# Patient Record
Sex: FEMALE | Race: WHITE | Employment: FULL TIME | URBAN - NONMETROPOLITAN AREA
[De-identification: names, ages, dates, MRNs, and addresses within clinical notes are randomized per-mention and may not be internally consistent; named-entity substitution may affect disease eponyms.]

---

## 2017-05-27 ENCOUNTER — HOSPITAL ENCOUNTER (EMERGENCY)
Age: 22
Discharge: HOME | End: 2017-05-27
Attending: EMERGENCY MEDICINE
Payer: COMMERCIAL

## 2017-05-27 ENCOUNTER — APPOINTMENT (OUTPATIENT)
Dept: GENERAL RADIOLOGY | Age: 22
End: 2017-05-27
Payer: COMMERCIAL

## 2017-05-27 VITALS
DIASTOLIC BLOOD PRESSURE: 75 MMHG | TEMPERATURE: 97.6 F | SYSTOLIC BLOOD PRESSURE: 113 MMHG | OXYGEN SATURATION: 99 % | HEART RATE: 78 BPM | RESPIRATION RATE: 16 BRPM

## 2017-05-27 DIAGNOSIS — S52.135A CLOSED NONDISPLACED FRACTURE OF NECK OF LEFT RADIUS, INITIAL ENCOUNTER: Primary | ICD-10-CM

## 2017-05-27 PROCEDURE — 99283 EMERGENCY DEPT VISIT LOW MDM: CPT

## 2017-05-27 PROCEDURE — 73080 X-RAY EXAM OF ELBOW: CPT

## 2017-05-27 PROCEDURE — 29105 APPLICATION LONG ARM SPLINT: CPT

## 2017-05-27 ASSESSMENT — PAIN DESCRIPTION - ORIENTATION: ORIENTATION: LEFT

## 2017-05-27 ASSESSMENT — PAIN DESCRIPTION - LOCATION: LOCATION: ELBOW

## 2017-05-27 ASSESSMENT — PAIN SCALES - GENERAL: PAINLEVEL_OUTOF10: 7

## 2018-02-27 ENCOUNTER — HOSPITAL ENCOUNTER (INPATIENT)
Age: 23
LOS: 5 days | Discharge: HOME | DRG: 720 | End: 2018-03-04
Attending: OBSTETRICS & GYNECOLOGY | Admitting: OBSTETRICS & GYNECOLOGY
Payer: COMMERCIAL

## 2018-02-27 ENCOUNTER — APPOINTMENT (OUTPATIENT)
Dept: GENERAL RADIOLOGY | Age: 23
DRG: 720 | End: 2018-02-27
Payer: COMMERCIAL

## 2018-02-27 ENCOUNTER — APPOINTMENT (OUTPATIENT)
Dept: ULTRASOUND IMAGING | Age: 23
DRG: 720 | End: 2018-02-27
Payer: COMMERCIAL

## 2018-02-27 DIAGNOSIS — O26.892 ABDOMINAL PAIN DURING PREGNANCY IN SECOND TRIMESTER: ICD-10-CM

## 2018-02-27 DIAGNOSIS — R68.89 FLU-LIKE SYMPTOMS: ICD-10-CM

## 2018-02-27 DIAGNOSIS — E87.6 HYPOKALEMIA: ICD-10-CM

## 2018-02-27 DIAGNOSIS — D64.9 ANEMIA, UNSPECIFIED TYPE: Primary | ICD-10-CM

## 2018-02-27 DIAGNOSIS — R10.9 ABDOMINAL PAIN DURING PREGNANCY IN SECOND TRIMESTER: ICD-10-CM

## 2018-02-27 LAB
-: ABNORMAL
ABSOLUTE EOS #: 0.27 K/UL (ref 0–0.44)
ABSOLUTE IMMATURE GRANULOCYTE: 0.54 K/UL (ref 0–0.3)
ABSOLUTE LYMPH #: 0.95 K/UL (ref 1.1–3.7)
ABSOLUTE MONO #: 0.68 K/UL (ref 0.1–1.2)
ABSOLUTE RETIC #: 0.05 M/UL (ref 0.03–0.08)
ALBUMIN SERPL-MCNC: 3.1 G/DL (ref 3.5–5.2)
ALBUMIN/GLOBULIN RATIO: 0.8 (ref 1–2.5)
ALLEN TEST: ABNORMAL
ALP BLD-CCNC: 77 U/L (ref 35–104)
ALT SERPL-CCNC: 13 U/L (ref 5–33)
AMORPHOUS: ABNORMAL
AMPHETAMINE SCREEN URINE: NEGATIVE
AMYLASE: 40 U/L (ref 28–100)
ANION GAP SERPL CALCULATED.3IONS-SCNC: 15 MMOL/L (ref 9–17)
AST SERPL-CCNC: 22 U/L
BACTERIA: ABNORMAL
BARBITURATE SCREEN URINE: NEGATIVE
BASOPHILS # BLD: 0 % (ref 0–2)
BASOPHILS ABSOLUTE: 0 K/UL (ref 0–0.2)
BENZODIAZEPINE SCREEN, URINE: NEGATIVE
BILIRUB SERPL-MCNC: 0.31 MG/DL (ref 0.3–1.2)
BILIRUBIN URINE: NEGATIVE
BUN BLDV-MCNC: 5 MG/DL (ref 6–20)
BUN/CREAT BLD: 10 (ref 9–20)
BUPRENORPHINE URINE: NEGATIVE
CALCIUM SERPL-MCNC: 8.6 MG/DL (ref 8.6–10.4)
CANNABINOID SCREEN URINE: POSITIVE
CARBOXYHEMOGLOBIN: ABNORMAL % (ref 0–5)
CASTS UA: ABNORMAL /LPF
CHLORIDE BLD-SCNC: 98 MMOL/L (ref 98–107)
CO2: 16 MMOL/L (ref 20–31)
COCAINE METABOLITE, URINE: NEGATIVE
COLOR: YELLOW
COMMENT UA: ABNORMAL
CREAT SERPL-MCNC: 0.48 MG/DL (ref 0.5–0.9)
CRYSTALS, UA: ABNORMAL /HPF
DIFFERENTIAL TYPE: ABNORMAL
DIRECT EXAM: NORMAL
EKG ATRIAL RATE: 115 BPM
EKG P AXIS: 60 DEGREES
EKG P-R INTERVAL: 150 MS
EKG Q-T INTERVAL: 316 MS
EKG QRS DURATION: 78 MS
EKG QTC CALCULATION (BAZETT): 437 MS
EKG R AXIS: 57 DEGREES
EKG T AXIS: 17 DEGREES
EKG VENTRICULAR RATE: 115 BPM
EOSINOPHILS RELATIVE PERCENT: 2 % (ref 1–4)
EPITHELIAL CELLS UA: ABNORMAL /HPF (ref 0–25)
FERRITIN: 20 UG/L (ref 13–150)
FIO2: ABNORMAL
FOLATE: 18.2 NG/ML
GFR AFRICAN AMERICAN: >60 ML/MIN
GFR NON-AFRICAN AMERICAN: >60 ML/MIN
GFR SERPL CREATININE-BSD FRML MDRD: ABNORMAL ML/MIN/{1.73_M2}
GFR SERPL CREATININE-BSD FRML MDRD: ABNORMAL ML/MIN/{1.73_M2}
GLUCOSE BLD-MCNC: 114 MG/DL (ref 70–99)
GLUCOSE URINE: NEGATIVE
HCO3 ARTERIAL: 13.1 MMOL/L (ref 22–26)
HCT VFR BLD CALC: 23.3 % (ref 36.3–47.1)
HEMOGLOBIN: 7 G/DL (ref 11.9–15.1)
IMMATURE GRANULOCYTES: 4 %
IMMATURE RETIC FRACT: 29.5 % (ref 2.7–18.3)
IRON SATURATION: 3 % (ref 20–55)
IRON: 13 UG/DL (ref 37–145)
KETONES, URINE: ABNORMAL
LACTIC ACID, SEPSIS WHOLE BLOOD: NORMAL MMOL/L (ref 0.5–1.9)
LACTIC ACID, SEPSIS: 0.5 MMOL/L (ref 0.5–1.9)
LACTIC ACID, WHOLE BLOOD: NORMAL MMOL/L (ref 0.7–2.1)
LACTIC ACID: 0.5 MMOL/L (ref 0.5–2.2)
LEUKOCYTE ESTERASE, URINE: ABNORMAL
LIPASE: 17 U/L (ref 13–60)
LYMPHOCYTES # BLD: 7 % (ref 24–43)
Lab: NORMAL
Lab: NORMAL
MCH RBC QN AUTO: 20.2 PG (ref 25.2–33.5)
MCHC RBC AUTO-ENTMCNC: 30 G/DL (ref 28.4–34.8)
MCV RBC AUTO: 67.1 FL (ref 82.6–102.9)
MDMA URINE: ABNORMAL
METHADONE SCREEN, URINE: NEGATIVE
METHAMPHETAMINE, URINE: NEGATIVE
METHEMOGLOBIN: ABNORMAL % (ref 0–1.9)
MODE: ABNORMAL
MONOCYTES # BLD: 5 % (ref 3–12)
MONONUCLEOSIS SCREEN: NEGATIVE
MORPHOLOGY: ABNORMAL
MUCUS: ABNORMAL
NEGATIVE BASE EXCESS, ART: 10.1 MMOL/L (ref 0–2)
NITRITE, URINE: NEGATIVE
NOTIFICATION TIME: ABNORMAL
NOTIFICATION: ABNORMAL
NRBC AUTOMATED: 0.5 PER 100 WBC
O2 DEVICE/FLOW/%: ABNORMAL
O2 SAT, ARTERIAL: 97.8 % (ref 95–98)
OPIATES, URINE: NEGATIVE
OTHER OBSERVATIONS UA: ABNORMAL
OXYCODONE SCREEN URINE: NEGATIVE
OXYHEMOGLOBIN: ABNORMAL % (ref 95–98)
PATIENT TEMP: 37
PCO2 ARTERIAL: 23.7 MMHG (ref 35–45)
PCO2, ART, TEMP ADJ: ABNORMAL
PDW BLD-RTO: 17.1 % (ref 11.8–14.4)
PEEP/CPAP: ABNORMAL
PH ARTERIAL: 7.36 (ref 7.35–7.45)
PH UA: 6 (ref 5–9)
PH, ART, TEMP ADJ: ABNORMAL (ref 7.35–7.45)
PHENCYCLIDINE, URINE: NEGATIVE
PLATELET # BLD: 217 K/UL (ref 138–453)
PLATELET ESTIMATE: ABNORMAL
PMV BLD AUTO: 9.7 FL (ref 8.1–13.5)
PO2 ARTERIAL: 106.3 MMHG (ref 80–100)
PO2, ART, TEMP ADJ: ABNORMAL MMHG (ref 80–100)
POSITIVE BASE EXCESS, ART: ABNORMAL MMOL/L (ref 0–2)
POTASSIUM SERPL-SCNC: 3.3 MMOL/L (ref 3.7–5.3)
PROPOXYPHENE, URINE: NEGATIVE
PROTEIN UA: NEGATIVE
PSV: ABNORMAL
PT. POSITION: ABNORMAL
RBC # BLD: 3.47 M/UL (ref 3.95–5.11)
RBC # BLD: ABNORMAL 10*6/UL
RBC UA: ABNORMAL /HPF (ref 0–2)
RENAL EPITHELIAL, UA: ABNORMAL /HPF
RESPIRATORY RATE: ABNORMAL
RETIC %: 1.5 % (ref 0.5–1.9)
RETIC HEMOGLOBIN: 23.5 PG (ref 28.2–35.7)
SAMPLE SITE: ABNORMAL
SEG NEUTROPHILS: 82 % (ref 36–65)
SEGMENTED NEUTROPHILS ABSOLUTE COUNT: 11.06 K/UL (ref 1.5–8.1)
SET RATE: ABNORMAL
SODIUM BLD-SCNC: 129 MMOL/L (ref 135–144)
SPECIFIC GRAVITY UA: 1.01 (ref 1.01–1.02)
SPECIMEN DESCRIPTION: NORMAL
SPECIMEN DESCRIPTION: NORMAL
STATUS: NORMAL
STATUS: NORMAL
TEST INFORMATION: ABNORMAL
TEXT FOR RESPIRATORY: ABNORMAL
THYROXINE, FREE: 1.04 NG/DL (ref 0.93–1.7)
TOTAL HB: ABNORMAL G/DL (ref 12–16)
TOTAL IRON BINDING CAPACITY: 455 UG/DL (ref 250–450)
TOTAL PROTEIN: 7.2 G/DL (ref 6.4–8.3)
TOTAL RATE: ABNORMAL
TRICHOMONAS: ABNORMAL
TRICYCLIC ANTIDEPRESSANTS, UR: NEGATIVE
TSH SERPL DL<=0.05 MIU/L-ACNC: 0.17 MIU/L (ref 0.3–5)
TURBIDITY: CLEAR
UNSATURATED IRON BINDING CAPACITY: 441.7 UG/DL (ref 112–347)
URINE HGB: NEGATIVE
UROBILINOGEN, URINE: NORMAL
VITAMIN B-12: 261 PG/ML (ref 211–946)
VT: ABNORMAL
WBC # BLD: 13.5 K/UL (ref 3.5–11.3)
WBC # BLD: ABNORMAL 10*3/UL
WBC UA: ABNORMAL /HPF (ref 0–5)
YEAST: ABNORMAL

## 2018-02-27 PROCEDURE — 6360000002 HC RX W HCPCS: Performed by: ADVANCED PRACTICE MIDWIFE

## 2018-02-27 PROCEDURE — 2580000003 HC RX 258: Performed by: INTERNAL MEDICINE

## 2018-02-27 PROCEDURE — 82607 VITAMIN B-12: CPT

## 2018-02-27 PROCEDURE — 81001 URINALYSIS AUTO W/SCOPE: CPT

## 2018-02-27 PROCEDURE — 6360000002 HC RX W HCPCS: Performed by: PHYSICIAN ASSISTANT

## 2018-02-27 PROCEDURE — 36430 TRANSFUSION BLD/BLD COMPNT: CPT

## 2018-02-27 PROCEDURE — 76805 OB US >/= 14 WKS SNGL FETUS: CPT

## 2018-02-27 PROCEDURE — 6370000000 HC RX 637 (ALT 250 FOR IP): Performed by: INTERNAL MEDICINE

## 2018-02-27 PROCEDURE — 84443 ASSAY THYROID STIM HORMONE: CPT

## 2018-02-27 PROCEDURE — 84145 PROCALCITONIN (PCT): CPT

## 2018-02-27 PROCEDURE — 82805 BLOOD GASES W/O2 SATURATION: CPT

## 2018-02-27 PROCEDURE — 96374 THER/PROPH/DIAG INJ IV PUSH: CPT

## 2018-02-27 PROCEDURE — 71046 X-RAY EXAM CHEST 2 VIEWS: CPT

## 2018-02-27 PROCEDURE — 87651 STREP A DNA AMP PROBE: CPT

## 2018-02-27 PROCEDURE — 87804 INFLUENZA ASSAY W/OPTIC: CPT

## 2018-02-27 PROCEDURE — 2000000000 HC ICU R&B

## 2018-02-27 PROCEDURE — 87040 BLOOD CULTURE FOR BACTERIA: CPT

## 2018-02-27 PROCEDURE — 76705 ECHO EXAM OF ABDOMEN: CPT

## 2018-02-27 PROCEDURE — 6360000002 HC RX W HCPCS: Performed by: INTERNAL MEDICINE

## 2018-02-27 PROCEDURE — 6370000000 HC RX 637 (ALT 250 FOR IP): Performed by: ADVANCED PRACTICE MIDWIFE

## 2018-02-27 PROCEDURE — 99285 EMERGENCY DEPT VISIT HI MDM: CPT

## 2018-02-27 PROCEDURE — 99221 1ST HOSP IP/OBS SF/LOW 40: CPT | Performed by: ADVANCED PRACTICE MIDWIFE

## 2018-02-27 PROCEDURE — 2580000003 HC RX 258: Performed by: PHYSICIAN ASSISTANT

## 2018-02-27 PROCEDURE — 86900 BLOOD TYPING SEROLOGIC ABO: CPT

## 2018-02-27 PROCEDURE — 85045 AUTOMATED RETICULOCYTE COUNT: CPT

## 2018-02-27 PROCEDURE — 80053 COMPREHEN METABOLIC PANEL: CPT

## 2018-02-27 PROCEDURE — 36415 COLL VENOUS BLD VENIPUNCTURE: CPT

## 2018-02-27 PROCEDURE — 6370000000 HC RX 637 (ALT 250 FOR IP): Performed by: PHYSICIAN ASSISTANT

## 2018-02-27 PROCEDURE — 86308 HETEROPHILE ANTIBODY SCREEN: CPT

## 2018-02-27 PROCEDURE — 84439 ASSAY OF FREE THYROXINE: CPT

## 2018-02-27 PROCEDURE — 82746 ASSAY OF FOLIC ACID SERUM: CPT

## 2018-02-27 PROCEDURE — 86901 BLOOD TYPING SEROLOGIC RH(D): CPT

## 2018-02-27 PROCEDURE — P9016 RBC LEUKOCYTES REDUCED: HCPCS

## 2018-02-27 PROCEDURE — 86920 COMPATIBILITY TEST SPIN: CPT

## 2018-02-27 PROCEDURE — 80306 DRUG TEST PRSMV INSTRMNT: CPT

## 2018-02-27 PROCEDURE — 83605 ASSAY OF LACTIC ACID: CPT

## 2018-02-27 PROCEDURE — 87086 URINE CULTURE/COLONY COUNT: CPT

## 2018-02-27 PROCEDURE — 83690 ASSAY OF LIPASE: CPT

## 2018-02-27 PROCEDURE — 85025 COMPLETE CBC W/AUTO DIFF WBC: CPT

## 2018-02-27 PROCEDURE — 86850 RBC ANTIBODY SCREEN: CPT

## 2018-02-27 PROCEDURE — 82150 ASSAY OF AMYLASE: CPT

## 2018-02-27 PROCEDURE — 36600 WITHDRAWAL OF ARTERIAL BLOOD: CPT

## 2018-02-27 PROCEDURE — 83550 IRON BINDING TEST: CPT

## 2018-02-27 PROCEDURE — 83540 ASSAY OF IRON: CPT

## 2018-02-27 PROCEDURE — 93005 ELECTROCARDIOGRAM TRACING: CPT

## 2018-02-27 PROCEDURE — 82728 ASSAY OF FERRITIN: CPT

## 2018-02-27 RX ORDER — 0.9 % SODIUM CHLORIDE 0.9 %
1000 INTRAVENOUS SOLUTION INTRAVENOUS ONCE
Status: COMPLETED | OUTPATIENT
Start: 2018-02-27 | End: 2018-02-27

## 2018-02-27 RX ORDER — ONDANSETRON 2 MG/ML
4 INJECTION INTRAMUSCULAR; INTRAVENOUS EVERY 6 HOURS PRN
Status: DISCONTINUED | OUTPATIENT
Start: 2018-02-27 | End: 2018-02-28

## 2018-02-27 RX ORDER — SODIUM CHLORIDE 9 MG/ML
INJECTION, SOLUTION INTRAVENOUS CONTINUOUS
Status: DISCONTINUED | OUTPATIENT
Start: 2018-02-27 | End: 2018-02-28

## 2018-02-27 RX ORDER — OSELTAMIVIR PHOSPHATE 75 MG/1
75 CAPSULE ORAL ONCE
Status: COMPLETED | OUTPATIENT
Start: 2018-02-27 | End: 2018-02-27

## 2018-02-27 RX ORDER — ACETAMINOPHEN 325 MG/1
650 TABLET ORAL EVERY 4 HOURS PRN
Status: DISCONTINUED | OUTPATIENT
Start: 2018-02-27 | End: 2018-02-28

## 2018-02-27 RX ORDER — ONDANSETRON 2 MG/ML
4 INJECTION INTRAMUSCULAR; INTRAVENOUS ONCE
Status: COMPLETED | OUTPATIENT
Start: 2018-02-27 | End: 2018-02-27

## 2018-02-27 RX ORDER — POTASSIUM CHLORIDE 20 MEQ/1
40 TABLET, EXTENDED RELEASE ORAL ONCE
Status: COMPLETED | OUTPATIENT
Start: 2018-02-27 | End: 2018-02-27

## 2018-02-27 RX ORDER — ACETAMINOPHEN 500 MG
1000 TABLET ORAL ONCE
Status: COMPLETED | OUTPATIENT
Start: 2018-02-27 | End: 2018-02-27

## 2018-02-27 RX ORDER — SODIUM CHLORIDE 0.9 % (FLUSH) 0.9 %
10 SYRINGE (ML) INJECTION PRN
Status: DISCONTINUED | OUTPATIENT
Start: 2018-02-27 | End: 2018-02-28

## 2018-02-27 RX ORDER — SODIUM CHLORIDE 0.9 % (FLUSH) 0.9 %
10 SYRINGE (ML) INJECTION EVERY 12 HOURS SCHEDULED
Status: DISCONTINUED | OUTPATIENT
Start: 2018-02-27 | End: 2018-02-28

## 2018-02-27 RX ORDER — OSELTAMIVIR PHOSPHATE 75 MG/1
75 CAPSULE ORAL 2 TIMES DAILY
Status: DISCONTINUED | OUTPATIENT
Start: 2018-02-28 | End: 2018-02-28

## 2018-02-27 RX ORDER — ACETAMINOPHEN 500 MG
1000 TABLET ORAL EVERY 6 HOURS PRN
Status: DISCONTINUED | OUTPATIENT
Start: 2018-02-27 | End: 2018-02-28

## 2018-02-27 RX ORDER — 0.9 % SODIUM CHLORIDE 0.9 %
250 INTRAVENOUS SOLUTION INTRAVENOUS ONCE
Status: DISCONTINUED | OUTPATIENT
Start: 2018-02-27 | End: 2018-03-04 | Stop reason: HOSPADM

## 2018-02-27 RX ORDER — DIPHENHYDRAMINE HCL 25 MG
25 CAPSULE ORAL EVERY 6 HOURS PRN
Status: DISCONTINUED | OUTPATIENT
Start: 2018-02-27 | End: 2018-02-28

## 2018-02-27 RX ADMIN — PIPERACILLIN SODIUM AND TAZOBACTAM SODIUM 3.38 G: 3; .375 INJECTION, POWDER, LYOPHILIZED, FOR SOLUTION INTRAVENOUS at 21:12

## 2018-02-27 RX ADMIN — OSELTAMIVIR PHOSPHATE 75 MG: 75 CAPSULE ORAL at 15:48

## 2018-02-27 RX ADMIN — ONDANSETRON 4 MG: 2 INJECTION INTRAMUSCULAR; INTRAVENOUS at 12:44

## 2018-02-27 RX ADMIN — DIPHENHYDRAMINE HYDROCHLORIDE 25 MG: 25 CAPSULE ORAL at 21:23

## 2018-02-27 RX ADMIN — SODIUM CHLORIDE: 9 INJECTION, SOLUTION INTRAVENOUS at 19:20

## 2018-02-27 RX ADMIN — ONDANSETRON 4 MG: 2 INJECTION INTRAMUSCULAR; INTRAVENOUS at 21:22

## 2018-02-27 RX ADMIN — ACETAMINOPHEN 1000 MG: 500 TABLET ORAL at 12:44

## 2018-02-27 RX ADMIN — POTASSIUM CHLORIDE 40 MEQ: 1500 TABLET, EXTENDED RELEASE ORAL at 15:44

## 2018-02-27 RX ADMIN — SODIUM CHLORIDE 1000 ML: 9 INJECTION, SOLUTION INTRAVENOUS at 12:51

## 2018-02-27 RX ADMIN — ACETAMINOPHEN 1000 MG: 500 TABLET, COATED ORAL at 19:24

## 2018-02-27 RX ADMIN — SODIUM CHLORIDE 1000 ML: 9 INJECTION, SOLUTION INTRAVENOUS at 15:44

## 2018-02-27 RX ADMIN — ACETAMINOPHEN 650 MG: 325 TABLET ORAL at 23:46

## 2018-02-27 ASSESSMENT — ENCOUNTER SYMPTOMS
ABDOMINAL PAIN: 1
EYE REDNESS: 0
EYE DISCHARGE: 0
DIARRHEA: 0
CHEST TIGHTNESS: 0
BACK PAIN: 0
BLOOD IN STOOL: 0
SORE THROAT: 1
NAUSEA: 0
RHINORRHEA: 0
WHEEZING: 0
SHORTNESS OF BREATH: 0
VOMITING: 1
CONSTIPATION: 0
COUGH: 1

## 2018-02-27 ASSESSMENT — PAIN SCALES - GENERAL
PAINLEVEL_OUTOF10: 7
PAINLEVEL_OUTOF10: 8
PAINLEVEL_OUTOF10: 9
PAINLEVEL_OUTOF10: 9
PAINLEVEL_OUTOF10: 2
PAINLEVEL_OUTOF10: 7
PAINLEVEL_OUTOF10: 7

## 2018-02-27 ASSESSMENT — PAIN DESCRIPTION - PAIN TYPE
TYPE: ACUTE PAIN

## 2018-02-27 ASSESSMENT — PAIN DESCRIPTION - LOCATION
LOCATION: GENERALIZED
LOCATION: GENERALIZED
LOCATION_2: HEAD
LOCATION: ABDOMEN

## 2018-02-27 ASSESSMENT — PAIN DESCRIPTION - DESCRIPTORS
DESCRIPTORS: ACHING
DESCRIPTORS: SHARP
DESCRIPTORS: ACHING
DESCRIPTORS: ACHING
DESCRIPTORS_2: ACHING

## 2018-02-27 ASSESSMENT — PAIN DESCRIPTION - INTENSITY: RATING_2: 7

## 2018-02-27 NOTE — LETTER
[facility] AT THE [facility] MED SURG  [address] [facility] [address]  Phone: [phone]             March 4, 2018    Patient: Kam Martin   YOB: 1995   Date of Visit: 2/27/2018       To Whom It May Concern:    Kam Martin was seen and treated in our facility  beginning 2/27/2018 until 3/4/2018. She may return to work on 3/5/2018.       Sincerely,       Alberto Madison RN         Signature:__________________________________

## 2018-02-27 NOTE — H&P
Value Date    COLORU YELLOW 02/27/2018    PROTEINU NEGATIVE 02/27/2018    PHUR 6.0 02/27/2018    WBCUA 2 TO 5 02/27/2018    RBCUA 0 TO 2 02/27/2018    MUCUS TRACE 02/27/2018    TRICHOMONAS NOT REPORTED 02/27/2018    YEAST NOT REPORTED 02/27/2018    BACTERIA 1+ 02/27/2018    SPECGRAV 1.015 02/27/2018    LEUKOCYTESUR SMALL 02/27/2018    UROBILINOGEN Normal 02/27/2018    BILIRUBINUR NEGATIVE 02/27/2018    GLUCOSEU NEGATIVE 02/27/2018    AMORPHOUS TRACE 02/27/2018       ASSESSMENT AND PLAN: 25year old, 32 week gestation female, with current febrile respiratory illness and severe anemia. Obstetrically stable. Estimated length of stay: 24-48 hours    Patient Active Hospital Problem List:   Anemia (2/27/2018)    Assessment: severe anemia    Plan: transfuse one unit and repeat cbc and observe for response          Fetus: Reassuring by sono    Other: with consult of Dr. Franklin Amos will transfuse one unit PRBCs this evening and re-evaluate, treat with tamiflu and antipyretics. Attempt to get minimally delivery records from [facility], and if possible further investigation into previous prenatal records from Dr. Mo Barnett office. Lenin Connolly.  ISA Colunga,2/27/2018 5:53 PM

## 2018-02-27 NOTE — ED PROVIDER NOTES
Shanna Saravia, nurse midwife on call. She is aware patient's presentation and current workup and results specifically fever and significant anemia. I told her she has been typed and screened. She is questionable if she wants blood. Does agree to admit her and will consult medicine. Procedures    FINAL IMPRESSION      1. Anemia, unspecified type    2. Abdominal pain during pregnancy in second trimester    3. Hypokalemia    4. Flu-like symptoms          DISPOSITION/PLAN   DISPOSITION        PATIENT REFERRED TO:  No follow-up provider specified. DISCHARGE MEDICATIONS:  New Prescriptions    No medications on file              Summation      Patient Course:      ED Medications administered this visit:    Medications   0.9 % sodium chloride bolus (1,000 mLs Intravenous New Bag 2/27/18 1544)   oseltamivir (TAMIFLU) capsule 75 mg (not administered)   0.9 % sodium chloride bolus (0 mLs Intravenous Stopped 2/27/18 1408)   ondansetron (ZOFRAN) injection 4 mg (4 mg Intravenous Given 2/27/18 1244)   acetaminophen (TYLENOL) tablet 1,000 mg (1,000 mg Oral Given 2/27/18 1244)   potassium chloride (KLOR-CON M) extended release tablet 40 mEq (40 mEq Oral Given 2/27/18 1544)       New Prescriptions from this visit:    New Prescriptions    No medications on file       Follow-up:  No follow-up provider specified. Final Impression:   1. Anemia, unspecified type    2. Abdominal pain during pregnancy in second trimester    3. Hypokalemia    4.  Flu-like symptoms               (Please note that portions of this note were completed with a voice recognition program.  Efforts were made to edit the dictations but occasionally words are mis-transcribed.)            Jalil Dodge PA-C  02/27/18 8440

## 2018-02-28 ENCOUNTER — ANESTHESIA EVENT (OUTPATIENT)
Dept: OPERATING ROOM | Age: 23
DRG: 720 | End: 2018-02-28
Payer: COMMERCIAL

## 2018-02-28 ENCOUNTER — ANESTHESIA (OUTPATIENT)
Dept: OPERATING ROOM | Age: 23
DRG: 720 | End: 2018-02-28
Payer: COMMERCIAL

## 2018-02-28 ENCOUNTER — TELEPHONE (OUTPATIENT)
Dept: OBGYN | Age: 23
End: 2018-02-28

## 2018-02-28 ENCOUNTER — APPOINTMENT (OUTPATIENT)
Dept: GENERAL RADIOLOGY | Age: 23
DRG: 720 | End: 2018-02-28
Payer: COMMERCIAL

## 2018-02-28 VITALS
DIASTOLIC BLOOD PRESSURE: 58 MMHG | OXYGEN SATURATION: 96 % | TEMPERATURE: 99.5 F | SYSTOLIC BLOOD PRESSURE: 115 MMHG | RESPIRATION RATE: 8 BRPM

## 2018-02-28 PROBLEM — N13.30 HYDRONEPHROSIS OF RIGHT KIDNEY: Status: ACTIVE | Noted: 2018-02-28

## 2018-02-28 PROBLEM — E87.1 HYPONATREMIA: Status: ACTIVE | Noted: 2018-02-28

## 2018-02-28 PROBLEM — E87.6 HYPOKALEMIA: Status: ACTIVE | Noted: 2018-02-28

## 2018-02-28 PROBLEM — A41.9 SEVERE SEPSIS (HCC): Status: ACTIVE | Noted: 2018-02-28

## 2018-02-28 PROBLEM — F12.10 CANNABIS ABUSE: Status: ACTIVE | Noted: 2018-02-28

## 2018-02-28 PROBLEM — R65.20 SEVERE SEPSIS (HCC): Status: ACTIVE | Noted: 2018-02-28

## 2018-02-28 LAB
ABSOLUTE EOS #: 0 K/UL (ref 0–0.44)
ABSOLUTE IMMATURE GRANULOCYTE: 0.09 K/UL (ref 0–0.3)
ABSOLUTE LYMPH #: 0.53 K/UL (ref 1.1–3.7)
ABSOLUTE MONO #: 0.26 K/UL (ref 0.1–1.2)
ALBUMIN SERPL-MCNC: 2.5 G/DL (ref 3.5–5.2)
ALBUMIN/GLOBULIN RATIO: 0.8 (ref 1–2.5)
ALP BLD-CCNC: 68 U/L (ref 35–104)
ALT SERPL-CCNC: 10 U/L (ref 5–33)
ANION GAP SERPL CALCULATED.3IONS-SCNC: 16 MMOL/L (ref 9–17)
AST SERPL-CCNC: 18 U/L
BASOPHILS # BLD: 0 % (ref 0–2)
BASOPHILS ABSOLUTE: 0 K/UL (ref 0–0.2)
BILIRUB SERPL-MCNC: 0.25 MG/DL (ref 0.3–1.2)
BUN BLDV-MCNC: 3 MG/DL (ref 6–20)
BUN/CREAT BLD: 10 (ref 9–20)
CALCIUM SERPL-MCNC: 7.4 MG/DL (ref 8.6–10.4)
CHLORIDE BLD-SCNC: 105 MMOL/L (ref 98–107)
CO2: 13 MMOL/L (ref 20–31)
CREAT SERPL-MCNC: 0.29 MG/DL (ref 0.5–0.9)
CULTURE: NORMAL
CULTURE: NORMAL
DIFFERENTIAL TYPE: ABNORMAL
DIRECT EXAM: NORMAL
DIRECT EXAM: NORMAL
EOSINOPHILS RELATIVE PERCENT: 0 % (ref 1–4)
FIBRINOGEN: 362 MG/DL (ref 185–451)
GFR AFRICAN AMERICAN: >60 ML/MIN
GFR NON-AFRICAN AMERICAN: >60 ML/MIN
GFR SERPL CREATININE-BSD FRML MDRD: ABNORMAL ML/MIN/{1.73_M2}
GFR SERPL CREATININE-BSD FRML MDRD: ABNORMAL ML/MIN/{1.73_M2}
GLUCOSE BLD-MCNC: 79 MG/DL (ref 70–99)
HCT VFR BLD CALC: 21.5 % (ref 36.3–47.1)
HCT VFR BLD CALC: 25.7 % (ref 36.3–47.1)
HEMOGLOBIN: 6.4 G/DL (ref 11.9–15.1)
HEMOGLOBIN: 7.7 G/DL (ref 11.9–15.1)
HEPATITIS B SURFACE ANTIGEN: NONREACTIVE
HEPATITIS C ANTIBODY: NONREACTIVE
HIV AG/AB: NONREACTIVE
IMMATURE GRANULOCYTES: 1 %
INR BLD: 1 (ref 0.9–1.2)
LACTIC ACID, WHOLE BLOOD: NORMAL MMOL/L (ref 0.7–2.1)
LACTIC ACID: 0.5 MMOL/L (ref 0.5–2.2)
LYMPHOCYTES # BLD: 6 % (ref 24–43)
Lab: NORMAL
Lab: NORMAL
MCH RBC QN AUTO: 21.1 PG (ref 25.2–33.5)
MCH RBC QN AUTO: 21.6 PG (ref 25.2–33.5)
MCHC RBC AUTO-ENTMCNC: 29.8 G/DL (ref 28.4–34.8)
MCHC RBC AUTO-ENTMCNC: 30 G/DL (ref 28.4–34.8)
MCV RBC AUTO: 70.7 FL (ref 82.6–102.9)
MCV RBC AUTO: 72 FL (ref 82.6–102.9)
MONOCYTES # BLD: 3 % (ref 3–12)
MORPHOLOGY: ABNORMAL
NRBC AUTOMATED: 0.2 PER 100 WBC
NRBC AUTOMATED: 0.3 PER 100 WBC
NUCLEATED RED BLOOD CELLS: 1 PER 100 WBC (ref 0–5)
PARTIAL THROMBOPLASTIN TIME: 33.1 SEC (ref 23.2–34.4)
PDW BLD-RTO: 18.7 % (ref 11.8–14.4)
PDW BLD-RTO: 21.2 % (ref 11.8–14.4)
PLATELET # BLD: 161 K/UL (ref 138–453)
PLATELET # BLD: ABNORMAL K/UL (ref 138–453)
PLATELET ESTIMATE: ABNORMAL
PLATELET, FLUORESCENCE: 127 K/UL (ref 138–453)
PLATELET, IMMATURE FRACTION: 2 % (ref 1.1–10.3)
PMV BLD AUTO: 9.8 FL (ref 8.1–13.5)
PMV BLD AUTO: ABNORMAL FL (ref 8.1–13.5)
POTASSIUM SERPL-SCNC: 3.6 MMOL/L (ref 3.7–5.3)
PROCALCITONIN: 0.14 NG/ML
PROTHROMBIN TIME: 10.7 SEC (ref 9.7–12.2)
RBC # BLD: 3.04 M/UL (ref 3.95–5.11)
RBC # BLD: 3.57 M/UL (ref 3.95–5.11)
RBC # BLD: ABNORMAL 10*6/UL
RUBV IGG SER QL: 3 IU/ML
SEG NEUTROPHILS: 90 % (ref 36–65)
SEGMENTED NEUTROPHILS ABSOLUTE COUNT: 7.93 K/UL (ref 1.5–8.1)
SODIUM BLD-SCNC: 134 MMOL/L (ref 135–144)
SPECIMEN DESCRIPTION: NORMAL
STATUS: NORMAL
STATUS: NORMAL
T. PALLIDUM, IGG: NONREACTIVE
TOTAL PROTEIN: 5.8 G/DL (ref 6.4–8.3)
WBC # BLD: 8.8 K/UL (ref 3.5–11.3)
WBC # BLD: 9.1 K/UL (ref 3.5–11.3)
WBC # BLD: ABNORMAL 10*3/UL

## 2018-02-28 PROCEDURE — 3700000001 HC ADD 15 MINUTES (ANESTHESIA): Performed by: UROLOGY

## 2018-02-28 PROCEDURE — 85730 THROMBOPLASTIN TIME PARTIAL: CPT

## 2018-02-28 PROCEDURE — 6370000000 HC RX 637 (ALT 250 FOR IP): Performed by: INTERNAL MEDICINE

## 2018-02-28 PROCEDURE — 83605 ASSAY OF LACTIC ACID: CPT

## 2018-02-28 PROCEDURE — 3600000014 HC SURGERY LEVEL 4 ADDTL 15MIN: Performed by: UROLOGY

## 2018-02-28 PROCEDURE — 6370000000 HC RX 637 (ALT 250 FOR IP): Performed by: UROLOGY

## 2018-02-28 PROCEDURE — 3600000004 HC SURGERY LEVEL 4 BASE: Performed by: UROLOGY

## 2018-02-28 PROCEDURE — 85025 COMPLETE CBC W/AUTO DIFF WBC: CPT

## 2018-02-28 PROCEDURE — 86762 RUBELLA ANTIBODY: CPT

## 2018-02-28 PROCEDURE — 87340 HEPATITIS B SURFACE AG IA: CPT

## 2018-02-28 PROCEDURE — 6370000000 HC RX 637 (ALT 250 FOR IP): Performed by: PHYSICIAN ASSISTANT

## 2018-02-28 PROCEDURE — 85027 COMPLETE CBC AUTOMATED: CPT

## 2018-02-28 PROCEDURE — 6360000002 HC RX W HCPCS: Performed by: NURSE ANESTHETIST, CERTIFIED REGISTERED

## 2018-02-28 PROCEDURE — 6360000002 HC RX W HCPCS: Performed by: INTERNAL MEDICINE

## 2018-02-28 PROCEDURE — 7100000000 HC PACU RECOVERY - FIRST 15 MIN: Performed by: UROLOGY

## 2018-02-28 PROCEDURE — 2580000003 HC RX 258: Performed by: INTERNAL MEDICINE

## 2018-02-28 PROCEDURE — 36415 COLL VENOUS BLD VENIPUNCTURE: CPT

## 2018-02-28 PROCEDURE — 6360000002 HC RX W HCPCS: Performed by: ADVANCED PRACTICE MIDWIFE

## 2018-02-28 PROCEDURE — P9016 RBC LEUKOCYTES REDUCED: HCPCS

## 2018-02-28 PROCEDURE — 0T768DZ DILATION OF RIGHT URETER WITH INTRALUMINAL DEVICE, VIA NATURAL OR ARTIFICIAL OPENING ENDOSCOPIC: ICD-10-PCS | Performed by: UROLOGY

## 2018-02-28 PROCEDURE — C2617 STENT, NON-COR, TEM W/O DEL: HCPCS | Performed by: UROLOGY

## 2018-02-28 PROCEDURE — 6360000002 HC RX W HCPCS: Performed by: UROLOGY

## 2018-02-28 PROCEDURE — 74018 RADEX ABDOMEN 1 VIEW: CPT

## 2018-02-28 PROCEDURE — 2580000003 HC RX 258: Performed by: ADVANCED PRACTICE MIDWIFE

## 2018-02-28 PROCEDURE — 85384 FIBRINOGEN ACTIVITY: CPT

## 2018-02-28 PROCEDURE — 85610 PROTHROMBIN TIME: CPT

## 2018-02-28 PROCEDURE — 2000000000 HC ICU R&B

## 2018-02-28 PROCEDURE — 80053 COMPREHEN METABOLIC PANEL: CPT

## 2018-02-28 PROCEDURE — 2500000003 HC RX 250 WO HCPCS: Performed by: UROLOGY

## 2018-02-28 PROCEDURE — 36430 TRANSFUSION BLD/BLD COMPNT: CPT

## 2018-02-28 PROCEDURE — 2500000003 HC RX 250 WO HCPCS: Performed by: NURSE ANESTHETIST, CERTIFIED REGISTERED

## 2018-02-28 PROCEDURE — 85055 RETICULATED PLATELET ASSAY: CPT

## 2018-02-28 PROCEDURE — S0028 INJECTION, FAMOTIDINE, 20 MG: HCPCS | Performed by: UROLOGY

## 2018-02-28 PROCEDURE — 2580000003 HC RX 258: Performed by: PHYSICIAN ASSISTANT

## 2018-02-28 PROCEDURE — 7100000001 HC PACU RECOVERY - ADDTL 15 MIN: Performed by: UROLOGY

## 2018-02-28 PROCEDURE — 3700000000 HC ANESTHESIA ATTENDED CARE: Performed by: UROLOGY

## 2018-02-28 PROCEDURE — 86780 TREPONEMA PALLIDUM: CPT

## 2018-02-28 PROCEDURE — 87389 HIV-1 AG W/HIV-1&-2 AB AG IA: CPT

## 2018-02-28 PROCEDURE — 2580000003 HC RX 258: Performed by: UROLOGY

## 2018-02-28 PROCEDURE — 86803 HEPATITIS C AB TEST: CPT

## 2018-02-28 DEVICE — URETERAL STENT
Type: IMPLANTABLE DEVICE | Site: URETER | Status: FUNCTIONAL
Brand: PERCUFLEX™ PLUS

## 2018-02-28 RX ORDER — ONDANSETRON 2 MG/ML
INJECTION INTRAMUSCULAR; INTRAVENOUS PRN
Status: DISCONTINUED | OUTPATIENT
Start: 2018-02-28 | End: 2018-02-28 | Stop reason: SDUPTHER

## 2018-02-28 RX ORDER — SODIUM CHLORIDE 0.9 % (FLUSH) 0.9 %
10 SYRINGE (ML) INJECTION EVERY 12 HOURS SCHEDULED
Status: DISCONTINUED | OUTPATIENT
Start: 2018-02-28 | End: 2018-03-04 | Stop reason: HOSPADM

## 2018-02-28 RX ORDER — ONDANSETRON 2 MG/ML
4 INJECTION INTRAMUSCULAR; INTRAVENOUS EVERY 6 HOURS PRN
Status: DISCONTINUED | OUTPATIENT
Start: 2018-02-28 | End: 2018-03-04 | Stop reason: HOSPADM

## 2018-02-28 RX ORDER — FENTANYL CITRATE 50 UG/ML
25 INJECTION, SOLUTION INTRAMUSCULAR; INTRAVENOUS EVERY 5 MIN PRN
Status: DISCONTINUED | OUTPATIENT
Start: 2018-02-28 | End: 2018-02-28 | Stop reason: HOSPADM

## 2018-02-28 RX ORDER — FENTANYL CITRATE 50 UG/ML
INJECTION, SOLUTION INTRAMUSCULAR; INTRAVENOUS PRN
Status: DISCONTINUED | OUTPATIENT
Start: 2018-02-28 | End: 2018-02-28 | Stop reason: SDUPTHER

## 2018-02-28 RX ORDER — SODIUM CHLORIDE 0.9 % (FLUSH) 0.9 %
10 SYRINGE (ML) INJECTION PRN
Status: DISCONTINUED | OUTPATIENT
Start: 2018-02-28 | End: 2018-03-04 | Stop reason: HOSPADM

## 2018-02-28 RX ORDER — DIPHENHYDRAMINE HCL 25 MG
25 CAPSULE ORAL EVERY 6 HOURS PRN
Status: DISCONTINUED | OUTPATIENT
Start: 2018-02-28 | End: 2018-03-04 | Stop reason: HOSPADM

## 2018-02-28 RX ORDER — SODIUM CHLORIDE 9 MG/ML
INJECTION, SOLUTION INTRAVENOUS CONTINUOUS
Status: DISCONTINUED | OUTPATIENT
Start: 2018-02-28 | End: 2018-03-02

## 2018-02-28 RX ORDER — ONDANSETRON 2 MG/ML
4 INJECTION INTRAMUSCULAR; INTRAVENOUS
Status: DISCONTINUED | OUTPATIENT
Start: 2018-02-28 | End: 2018-02-28 | Stop reason: HOSPADM

## 2018-02-28 RX ORDER — SODIUM CHLORIDE 9 MG/ML
INJECTION, SOLUTION INTRAVENOUS CONTINUOUS
Status: DISCONTINUED | OUTPATIENT
Start: 2018-02-28 | End: 2018-02-28

## 2018-02-28 RX ORDER — SUCCINYLCHOLINE CHLORIDE 20 MG/ML
INJECTION INTRAMUSCULAR; INTRAVENOUS PRN
Status: DISCONTINUED | OUTPATIENT
Start: 2018-02-28 | End: 2018-02-28 | Stop reason: SDUPTHER

## 2018-02-28 RX ORDER — MORPHINE SULFATE 2 MG/ML
2 INJECTION, SOLUTION INTRAMUSCULAR; INTRAVENOUS
Status: DISCONTINUED | OUTPATIENT
Start: 2018-02-28 | End: 2018-03-01

## 2018-02-28 RX ORDER — 0.9 % SODIUM CHLORIDE 0.9 %
250 INTRAVENOUS SOLUTION INTRAVENOUS ONCE
Status: COMPLETED | OUTPATIENT
Start: 2018-02-28 | End: 2018-02-28

## 2018-02-28 RX ORDER — ACETAMINOPHEN 500 MG
1000 TABLET ORAL EVERY 6 HOURS PRN
Status: DISCONTINUED | OUTPATIENT
Start: 2018-02-28 | End: 2018-03-04 | Stop reason: HOSPADM

## 2018-02-28 RX ORDER — PROPOFOL 10 MG/ML
INJECTION, EMULSION INTRAVENOUS PRN
Status: DISCONTINUED | OUTPATIENT
Start: 2018-02-28 | End: 2018-02-28 | Stop reason: SDUPTHER

## 2018-02-28 RX ORDER — LIDOCAINE HYDROCHLORIDE 20 MG/ML
INJECTION, SOLUTION INFILTRATION; PERINEURAL PRN
Status: DISCONTINUED | OUTPATIENT
Start: 2018-02-28 | End: 2018-02-28 | Stop reason: SDUPTHER

## 2018-02-28 RX ORDER — OSELTAMIVIR PHOSPHATE 75 MG/1
75 CAPSULE ORAL 2 TIMES DAILY
Status: DISCONTINUED | OUTPATIENT
Start: 2018-02-28 | End: 2018-02-28

## 2018-02-28 RX ORDER — ACETAMINOPHEN 325 MG/1
650 TABLET ORAL EVERY 4 HOURS PRN
Status: DISCONTINUED | OUTPATIENT
Start: 2018-02-28 | End: 2018-03-01

## 2018-02-28 RX ADMIN — SODIUM CHLORIDE: 9 INJECTION, SOLUTION INTRAVENOUS at 03:57

## 2018-02-28 RX ADMIN — ACETAMINOPHEN 650 MG: 325 TABLET ORAL at 12:09

## 2018-02-28 RX ADMIN — SODIUM CHLORIDE 250 ML: 9 INJECTION, SOLUTION INTRAVENOUS at 06:42

## 2018-02-28 RX ADMIN — SUCCINYLCHOLINE CHLORIDE 100 MG: 20 INJECTION, SOLUTION INTRAMUSCULAR; INTRAVENOUS at 09:21

## 2018-02-28 RX ADMIN — SODIUM CHLORIDE: 9 INJECTION, SOLUTION INTRAVENOUS at 12:19

## 2018-02-28 RX ADMIN — FENTANYL CITRATE 50 MCG: 50 INJECTION INTRAMUSCULAR; INTRAVENOUS at 09:21

## 2018-02-28 RX ADMIN — LIDOCAINE HYDROCHLORIDE 60 MG: 20 INJECTION, SOLUTION INFILTRATION; PERINEURAL at 09:21

## 2018-02-28 RX ADMIN — ONDANSETRON 4 MG: 2 INJECTION INTRAMUSCULAR; INTRAVENOUS at 09:28

## 2018-02-28 RX ADMIN — PROPOFOL 150 MG: 10 INJECTION, EMULSION INTRAVENOUS at 09:21

## 2018-02-28 RX ADMIN — ACETAMINOPHEN 650 MG: 325 TABLET ORAL at 06:28

## 2018-02-28 RX ADMIN — ACETAMINOPHEN 1000 MG: 500 TABLET, COATED ORAL at 23:11

## 2018-02-28 RX ADMIN — PIPERACILLIN SODIUM AND TAZOBACTAM SODIUM 3.38 G: 3; .375 INJECTION, POWDER, LYOPHILIZED, FOR SOLUTION INTRAVENOUS at 03:57

## 2018-02-28 RX ADMIN — SODIUM CHLORIDE: 9 INJECTION, SOLUTION INTRAVENOUS at 19:15

## 2018-02-28 RX ADMIN — FENTANYL CITRATE 25 MCG: 50 INJECTION INTRAMUSCULAR; INTRAVENOUS at 09:35

## 2018-02-28 RX ADMIN — Medication 10 ML: at 20:05

## 2018-02-28 RX ADMIN — MORPHINE SULFATE 2 MG: 2 INJECTION, SOLUTION INTRAMUSCULAR; INTRAVENOUS at 12:21

## 2018-02-28 RX ADMIN — BENZOCAINE AND MENTHOL 1 LOZENGE: 15; 3.6 LOZENGE ORAL at 23:10

## 2018-02-28 RX ADMIN — FENTANYL CITRATE 25 MCG: 50 INJECTION INTRAMUSCULAR; INTRAVENOUS at 09:29

## 2018-02-28 RX ADMIN — ONDANSETRON 4 MG: 2 INJECTION INTRAMUSCULAR; INTRAVENOUS at 03:57

## 2018-02-28 RX ADMIN — DEXTROSE MONOHYDRATE 3.38 G: 50 INJECTION, SOLUTION INTRAVENOUS at 13:15

## 2018-02-28 RX ADMIN — BENZOCAINE AND MENTHOL 1 LOZENGE: 15; 3.6 LOZENGE ORAL at 15:33

## 2018-02-28 RX ADMIN — FAMOTIDINE 20 MG: 10 INJECTION, SOLUTION INTRAVENOUS at 20:05

## 2018-02-28 RX ADMIN — MORPHINE SULFATE 2 MG: 2 INJECTION, SOLUTION INTRAMUSCULAR; INTRAVENOUS at 15:33

## 2018-02-28 RX ADMIN — PHENYLEPHRINE HYDROCHLORIDE 50 MCG: 10 INJECTION INTRAVENOUS at 09:40

## 2018-02-28 RX ADMIN — ACETAMINOPHEN 650 MG: 325 TABLET ORAL at 16:47

## 2018-02-28 RX ADMIN — SODIUM CHLORIDE, PRESERVATIVE FREE 10 ML: 5 INJECTION INTRAVENOUS at 08:14

## 2018-02-28 RX ADMIN — MORPHINE SULFATE 2 MG: 2 INJECTION, SOLUTION INTRAMUSCULAR; INTRAVENOUS at 22:20

## 2018-02-28 RX ADMIN — DEXTROSE MONOHYDRATE 3.38 G: 50 INJECTION, SOLUTION INTRAVENOUS at 19:15

## 2018-02-28 RX ADMIN — PHENYLEPHRINE HYDROCHLORIDE 50 MCG: 10 INJECTION INTRAVENOUS at 09:38

## 2018-02-28 RX ADMIN — PROPOFOL 40 MG: 10 INJECTION, EMULSION INTRAVENOUS at 09:31

## 2018-02-28 ASSESSMENT — PAIN SCALES - GENERAL
PAINLEVEL_OUTOF10: 8
PAINLEVEL_OUTOF10: 7
PAINLEVEL_OUTOF10: 8
PAINLEVEL_OUTOF10: 3
PAINLEVEL_OUTOF10: 6
PAINLEVEL_OUTOF10: 0
PAINLEVEL_OUTOF10: 8
PAINLEVEL_OUTOF10: 3
PAINLEVEL_OUTOF10: 0
PAINLEVEL_OUTOF10: 0
PAINLEVEL_OUTOF10: 4

## 2018-02-28 ASSESSMENT — PULMONARY FUNCTION TESTS
PIF_VALUE: 16
PIF_VALUE: 16
PIF_VALUE: 27
PIF_VALUE: 16
PIF_VALUE: 16
PIF_VALUE: 14
PIF_VALUE: 15
PIF_VALUE: 16
PIF_VALUE: 15
PIF_VALUE: 1
PIF_VALUE: 18
PIF_VALUE: 1
PIF_VALUE: 15
PIF_VALUE: 16
PIF_VALUE: 2
PIF_VALUE: 1
PIF_VALUE: 0
PIF_VALUE: 18
PIF_VALUE: 1
PIF_VALUE: 15
PIF_VALUE: 16
PIF_VALUE: 1
PIF_VALUE: 15
PIF_VALUE: 10
PIF_VALUE: 1
PIF_VALUE: 1

## 2018-02-28 ASSESSMENT — PAIN DESCRIPTION - LOCATION
LOCATION: ABDOMEN
LOCATION: THROAT
LOCATION: ABDOMEN;FLANK
LOCATION: ABDOMEN
LOCATION: ABDOMEN
LOCATION: ABDOMEN;FLANK
LOCATION: ABDOMEN

## 2018-02-28 ASSESSMENT — PAIN DESCRIPTION - DESCRIPTORS
DESCRIPTORS: SORE
DESCRIPTORS: ACHING;CRAMPING
DESCRIPTORS: ACHING;DISCOMFORT
DESCRIPTORS: ACHING
DESCRIPTORS: SORE
DESCRIPTORS: CRAMPING;ACHING
DESCRIPTORS: ACHING;CRAMPING
DESCRIPTORS: ACHING;CRAMPING

## 2018-02-28 ASSESSMENT — PAIN DESCRIPTION - PAIN TYPE
TYPE: ACUTE PAIN

## 2018-02-28 ASSESSMENT — PAIN DESCRIPTION - FREQUENCY
FREQUENCY: CONTINUOUS

## 2018-02-28 ASSESSMENT — PAIN DESCRIPTION - ORIENTATION
ORIENTATION: RIGHT;LOWER
ORIENTATION: LOWER;RIGHT
ORIENTATION: RIGHT;LOWER

## 2018-02-28 ASSESSMENT — PAIN - FUNCTIONAL ASSESSMENT: PAIN_FUNCTIONAL_ASSESSMENT: 0-10

## 2018-02-28 NOTE — PROGRESS NOTES
Updated pt on plan of care and that Dr. Catherine Come consulted Dr. Rob Guerrier and he will be placing a stent in the morning, verbalizes understanding. Educated pt on the importance of having the reddy catheter placed for accurate I&Os and for the obstructing calculus in the right ureter, states, \" No way, I'm not doing it. \" Pt refuses reddy catheter and refuses to have another IV started for blood transfusion. Pt states, \" I have been poked enough! I'm not getting poked again! \"

## 2018-02-28 NOTE — PROGRESS NOTES
[facility] office, office states she is at hospital [facility] ([place], [place]). Called OB dept at [facility] and left message with staff to have Machelle Risen call back when able.

## 2018-02-28 NOTE — PLAN OF CARE
Pt refuses reddy for I and O. Explained importance r/t sepsis and pt still refused. Dr Grace Esquivel informed of refusal. Pt being prepped for transfer to ICU.

## 2018-02-28 NOTE — PROGRESS NOTES
at this time and fetal monitor with no signs of contractions noted per Gabriel Bae from [facility] department.

## 2018-02-28 NOTE — PROGRESS NOTES
Pt c/o \"feeling hot\". Temperature 100.5, coming down from 101.1. Writer offered some cold wash clothes. Pt refused them. Will continue to monitor.

## 2018-02-28 NOTE — PROGRESS NOTES
Pt transferred from [facility] to ICU via bed d/t sepsis. Pt A & O x 4, tearful, states \" I just hurt everywhere. \" BP  117/57, , Sp02 100% on RA, temp 100.9. Oriented pt to surroundings and plan of care, verbalizes understanding. Finance at bedside. Call light in reach.

## 2018-02-28 NOTE — PLAN OF CARE
Problem: Pain:  Goal: Pain level will decrease  Pain level will decrease   Outcome: Ongoing  Pain assessment complete during hourly rounding. Pain scale of 0-10 being used to assess patients pain level. Pain medication administered as ordered. Will continue to monitor. Problem: Infection:  Goal: Will remain free from infection  Will remain free from infection   Outcome: Ongoing  Pt continues to have low grade fevers, WBC elevated, hypotension. Will continue to monitor for any increasing s/s of infection. Problem: Skin Integrity:  Goal: Skin integrity will stabilize  Skin integrity will stabilize   Outcome: Ongoing  Frequent and close monitoring of pt's skin being assessed with assessments and prn. Pt turns and repositions self frequently in bed, pt independent. Problem: Gas Exchange - Impaired:  Goal: Levels of oxygenation will improve  Levels of oxygenation will improve  Outcome: Ongoing  Lung sounds remain clear. SP02 greater than 90% on RA. Will continue to monitor for any s/s of impaired gas exchange.

## 2018-02-28 NOTE — ANESTHESIA PRE PROCEDURE
Marcelino Ashley MD   Stopped at 02/28/18 0814    [MAR Hold] 0.9 % sodium chloride infusion   Intravenous Continuous Sonam Chilel  mL/hr at 02/28/18 1274         Allergies:  No Known Allergies    Problem List:    Patient Active Problem List   Diagnosis Code    Anemia D64.9       Past Medical History:        Diagnosis Date    Anemia affecting third pregnancy        Past Surgical History:  History reviewed. No pertinent surgical history. Social History:    Social History   Substance Use Topics    Smoking status: Former Smoker     Packs/day: 0.50    Smokeless tobacco: Never Used    Alcohol use No                                Counseling given: Not Answered      Vital Signs (Current):   Vitals:    02/28/18 0757 02/28/18 0800 02/28/18 0815 02/28/18 0829   BP:  89/68 (!) 104/53 112/65   Pulse: 119  111 112   Resp: 20 20 18   Temp: 38 °C (100.4 °F)  37.4 °C (99.4 °F) 38.1 °C (100.6 °F)   TempSrc:   Temporal Temporal   SpO2:    97%   Weight:       Height:                                                  BP Readings from Last 3 Encounters:   02/28/18 112/65   05/27/17 113/75       NPO Status: Time of last liquid consumption: 0730                                                 Date of last liquid consumption: 02/28/18                        Date of last solid food consumption: 02/26/18    BMI:   Wt Readings from Last 3 Encounters:   02/28/18 149 lb 11.2 oz (67.9 kg)     Body mass index is 24.91 kg/m².     CBC:   Lab Results   Component Value Date    WBC 8.8 02/28/2018    RBC 3.04 02/28/2018    HGB 6.4 02/28/2018    HCT 21.5 02/28/2018    MCV 70.7 02/28/2018    RDW 18.7 02/28/2018     02/28/2018       CMP:   Lab Results   Component Value Date     02/28/2018    K 3.6 02/28/2018     02/28/2018    CO2 13 02/28/2018    BUN 3 02/28/2018    CREATININE 0.29 02/28/2018    GFRAA >60 02/28/2018    LABGLOM >60 02/28/2018    GLUCOSE 79 02/28/2018    PROT 5.8 02/28/2018    CALCIUM 7.4 02/28/2018    BILITOT

## 2018-02-28 NOTE — PROGRESS NOTES
at this time and fetal monitor remains intact with no contractions noted per Basil Christopher from [facility] department.

## 2018-02-28 NOTE — PROGRESS NOTES
CL)            Membranes:  Intact    Fetal heart rate:    Baseline: wnl bpm,              Contraction frequency: pt denies, none graphed post op    DATA:  Results for orders placed or performed during the hospital encounter of 02/27/18   Rapid influenza A/B antigens   Result Value Ref Range    Specimen Description . NASOPHARYNGEAL SWAB     Special Requests NOT REPORTED     Direct Exam       Presumptive negative for the presence of Influenza A and Influenza B antigen. Direct Exam        PCR confirmation of negative results is recommended, since the antigen present    Direct Exam        in the specimen may be below the detection limit of the test.    Direct Exam       Performed at [address]. [facility] [address] [place] [address]    Direct Exam  ([phone]     Status FINAL 02/27/2018    Strep Screen Group A Throat   Result Value Ref Range    Specimen Description . THROAT     Special Requests NOT REPORTED     Direct Exam       Rapid Strep A negative. A negative Rapid Group A Strep Screen result does not    Direct Exam        rule out the possibility of Group A Streptococci in the specimen. A Group A    Direct Exam  strep DNA test will be performed. Direct Exam       Performed at [address]    Direct Exam  ([phone]     Status FINAL 02/27/2018    Urine Culture   Result Value Ref Range    Specimen Description       . CLEAN CATCH URINE Performed at [facility] [address]    Specimen Description  [facility] [address] [place] [address]  ([phone]     Special Requests NOT REPORTED     Culture NO SIGNIFICANT GROWTH     Culture       Performed at [facility] [address], [address] [phone]    Status FINAL 02/28/2018    Culture Blood #1   Result Value Ref Range    Specimen Description . BLOOD     Special Requests LT WRIST 11 MLS     Culture NO GROWTH 11 HOURS     Culture       Performed at [facility] NEG    Phencyclidine, Urine NEGATIVE NEG    Propoxyphene, Urine NEGATIVE NEG    Cannabinoid Scrn, Ur POSITIVE (A) NEG    Oxycodone Screen, Ur NEGATIVE NEG    Methamphetamine, Urine NEGATIVE NEG    Tricyclic Antidepressants, Ur NEGATIVE NEG    MDMA URINE NOT REPORTED NEG    Buprenorphine Urine NEGATIVE NEG    Test Information NOT REPORTED    Lactic acid, plasma   Result Value Ref Range    Lactic Acid 0.5 0.5 - 2.2 mmol/L    Lactic Acid, Whole Blood NOT REPORTED 0.7 - 2.1 mmol/L   Blood Gas, Arterial   Result Value Ref Range    pH, Arterial 7.361 7.35 - 7.45    pCO2, Arterial 23.7 (L) 35 - 45 mmHg    pO2, Arterial 106.3 (H) 80 - 100 mmHg    HCO3, Arterial 13.1 (L) 22 - 26 mmol/L    Positive Base Excess, Art NOT REPORTED 0.0 - 2.0 mmol/L    Negative Base Excess, Art 10.1 (H) 0.0 - 2.0 mmol/L    O2 Sat, Arterial 97.8 95 - 98 %    Total Hb NOT REPORTED 12.0 - 16.0 g/dl    Oxyhemoglobin NOT REPORTED 95.0 - 98.0 %    Carboxyhemoglobin NOT REPORTED 0 - 5 %    Methemoglobin NOT REPORTED 0.0 - 1.9 %    Pt Temp 37.0     pH, Art, Temp Adj NOT REPORTED 7.350 - 7.450    pCO2, Art, Temp Adj NOT REPORTED     pO2, Art, Temp Adj NOT REPORTED 80.0 - 100.0 mmHg    O2 Device/Flow/% ROOM AIR     Respiratory Rate NOT REPORTED     Dionte Test PASS     Sample Site Right Radial Artery     Pt.  Position SEMI-FOWLERS     Mode NOT REPORTED     Set Rate NOT REPORTED     Total Rate NOT REPORTED     VT NOT REPORTED     FIO2 NOT REPORTED     Peep/Cpap NOT REPORTED     PSV NOT REPORTED     Text for Respiratory NOT REPORTED     NOTIFICATION NOT REPORTED     NOTIFICATION TIME NOT REPORTED    Procalcitonin   Result Value Ref Range    Procalcitonin 0.14 (H) <0.09 ng/mL   Amylase   Result Value Ref Range    Amylase 40 28 - 100 U/L   Lipase   Result Value Ref Range    Lipase 17 13 - 60 U/L   Iron and TIBC   Result Value Ref Range    Iron 13 (L) 37 - 145 ug/dL    TIBC 455 (H) 250 - 450 ug/dL    Iron Saturation 3 (L) 20 - 55 %    UIBC 441.7 (H) 112 - 347 ug/dL   Ferritin   Result Value Ref Range    Ferritin 20 13 - 150 ug/L   Reticulocytes   Result Value Ref Range    Retic % 1.5 0.5 - 1.9 %    Absolute Retic # 0.049 0.030 - 0.080 M/uL    Immature Retic Fract 29.500 (H) 2.7 - 18.3 %    Retic Hemoglobin 23.5 (L) 28.2 - 35.7 pg   Vitamin B12 & Folate   Result Value Ref Range    Vitamin B-12 261 211 - 946 pg/mL    Folate 18.2 >4.8 ng/mL   T4, Free   Result Value Ref Range    Thyroxine, Free 1.04 0.93 - 1.70 ng/dL   TSH without Reflex   Result Value Ref Range    TSH 0.17 (L) 0.30 - 5.00 mIU/L   Lactate, Sepsis   Result Value Ref Range    Lactic Acid, Sepsis 0.5 0.5 - 1.9 mmol/L    Lactic Acid, Sepsis, Whole Blood NOT REPORTED 0.5 - 1.9 mmol/L   Comprehensive Metabolic Panel w/ Reflex to MG   Result Value Ref Range    Glucose 79 70 - 99 mg/dL    BUN 3 (L) 6 - 20 mg/dL    CREATININE 0.29 (L) 0.50 - 0.90 mg/dL    Bun/Cre Ratio 10 9 - 20    Calcium 7.4 (L) 8.6 - 10.4 mg/dL    Sodium 134 (L) 135 - 144 mmol/L    Potassium 3.6 (L) 3.7 - 5.3 mmol/L    Chloride 105 98 - 107 mmol/L    CO2 13 (L) 20 - 31 mmol/L    Anion Gap 16 9 - 17 mmol/L    Alkaline Phosphatase 68 35 - 104 U/L    ALT 10 5 - 33 U/L    AST 18 <32 U/L    Total Bilirubin 0.25 (L) 0.3 - 1.2 mg/dL    Total Protein 5.8 (L) 6.4 - 8.3 g/dL    Alb 2.5 (L) 3.5 - 5.2 g/dL    Albumin/Globulin Ratio 0.8 (L) 1.0 - 2.5    GFR Non-African American >60 >60 mL/min    GFR African American >60 >60 mL/min    GFR Comment          GFR Staging         PRENATAL PROFILE I   Result Value Ref Range    WBC 8.8 3.5 - 11.3 k/uL    RBC 3.04 (L) 3.95 - 5.11 m/uL    Hemoglobin 6.4 (LL) 11.9 - 15.1 g/dL    Hematocrit 21.5 (L) 36.3 - 47.1 %    MCV 70.7 (L) 82.6 - 102.9 fL    MCH 21.1 (L) 25.2 - 33.5 pg    MCHC 29.8 28.4 - 34.8 g/dL    RDW 18.7 (H) 11.8 - 14.4 %    Platelets 942 667 - 532 k/uL    MPV 9.8 8.1 - 13.5 fL    NRBC Automated 0.3 (H) 0.0 per 100 WBC    Rubella Antibody, IGG Pending IU/mL    Hepatitis B Surface Ag Pending     Differential

## 2018-02-28 NOTE — CONSULTS
Urology Consultation    Patient:  Keisha Osorio  MRN: [id number]  YOB: 1995    CHIEF COMPLAINT:  Right hydronephrosis    HISTORY OF PRESENT ILLNESS:   The patient is a 25 y.o. female who presents with right flank pain. Pt is 26 weeks pregnant with her 4th child. Pt has never had renal calculus in the past. Pt has ne kalyan seen  in the past. Currently pt had recent JUAREZ which was independently reviewed. This does show right sided hydronephrosis with a possible obstructing calculi    Patient's old records, notes and chart reviewed and summarized above. Past Medical History:    Past Medical History:   Diagnosis Date    Anemia affecting third pregnancy        Past Surgical History:    History reviewed. No pertinent surgical history. Previous  surgery: none   Medications:    Scheduled Meds:   sodium chloride  250 mL Intravenous Once    [MAR Hold] oseltamivir  75 mg Oral BID    [MAR Hold] sodium chloride  250 mL Intravenous Once    [facility] Hold] sodium chloride flush  10 mL Intravenous 2 times per day    [MAR Hold] famotidine (PEPCID) injection  20 mg Intravenous BID    [MAR Hold] piperacillin-tazobactam  3.375 g Intravenous Q8H     Continuous Infusions:   [MAR Hold] sodium chloride 150 mL/hr at 02/27/18 1920    [MAR Hold] sodium chloride 185 mL/hr at 02/28/18 0814     PRN Meds:. [MAR Hold] acetaminophen, [MAR Hold] ondansetron, [MAR Hold] diphenhydrAMINE, [MAR Hold] sodium chloride flush, [MAR Hold] acetaminophen    Allergies:  Patient has no known allergies. Social History:    Social History     Social History    Marital status: Single     Spouse name: N/A    Number of children: N/A    Years of education: N/A     Occupational History    Not on file.      Social History Main Topics    Smoking status: Former Smoker     Packs/day: 0.50    Smokeless tobacco: Never Used    Alcohol use No    Drug use: No    Sexual activity: Not on file     Other Topics Concern    Not on file     Social History Narrative    No narrative on file       Family History:  History reviewed. No pertinent family history.   Previous Urologic Family history: none  REVIEW OF SYSTEMS:  Constitutional: negative  Eyes: negative  Respiratory: negative  Cardiovascular: negative  Gastrointestinal: negative  Genitourinary: see HPI  Musculoskeletal: negative  Skin: negative   Neurological: negative  Hematological/Lymphatic: negative  Psychological: negative    Physical Exam:      This a 25 y.o. female   Patient Vitals for the past 24 hrs:   BP Temp Temp src Pulse Resp SpO2 Height Weight   02/28/18 0829 112/65 100.6 °F (38.1 °C) Temporal 112 18 97 % - -   02/28/18 0815 (!) 104/53 99.4 °F (37.4 °C) Temporal 111 20 - - -   02/28/18 0800 89/68 - - - - - - -   02/28/18 0757 - 100.4 °F (38 °C) - 119 20 - - -   02/28/18 0754 - - - - - - 5' 5\" (1.651 m) -   02/28/18 0730 (!) 104/53 100.5 °F (38.1 °C) Temporal 113 20 96 % - -   02/28/18 0715 (!) 107/51 101.4 °F (38.6 °C) Temporal 118 20 98 % - -   02/28/18 0700 (!) 107/56 101.3 °F (38.5 °C) Temporal 117 20 97 % - -   02/28/18 0656 - - - 115 - - - -   02/28/18 0655 (!) 95/50 101.6 °F (38.7 °C) Temporal 110 20 100 % - -   02/28/18 0645 (!) 95/54 101.4 °F (38.6 °C) Axillary 113 22 100 % - -   02/28/18 0640 103/60 99.6 °F (37.6 °C) Oral 116 22 100 % - -   02/28/18 0621 122/72 100.5 °F (38.1 °C) Temporal 122 22 99 % - -   02/28/18 0600 97/70 - - 115 24 98 % - -   02/28/18 0500 (!) 94/50 - - 106 18 99 % - -   02/28/18 0400 - 99.3 °F (37.4 °C) Temporal 104 16 100 % - 149 lb 11.2 oz (67.9 kg)   02/28/18 0300 99/62 - - 109 23 97 % - -   02/28/18 0200 (!) 97/50 99.2 °F (37.3 °C) Temporal 116 16 93 % - -   02/28/18 0045 102/64 98.7 °F (37.1 °C) Temporal 109 18 94 % - -   02/28/18 0014 (!) 91/45 99.7 °F (37.6 °C) Temporal 116 20 96 % - -   02/28/18 0005 (!) 102/50 99.6 °F (37.6 °C) Temporal 116 25 96 % - -   02/28/18 0000 (!) 102/50 - - 113 16 96 % - -   02/27/18 2350 (!) 95/46 99.6 °F (37.6 °C) Temporal

## 2018-02-28 NOTE — PROGRESS NOTES
Alan Valladares RN from [facility] at patient bedside; attaches patient to fetal monitor at this time.

## 2018-02-28 NOTE — BRIEF OP NOTE
Brief Postoperative Note  ______________________________________________________________    Patient: Lucia Villalobos  YOB: 1995  MRN: [id number]  Date of Procedure: 2/28/2018    Pre-Op Diagnosis: RIGHT URETERAL CALCULUS    Post-Op Diagnosis: Same       Procedure(s):  CYSTOSCOPY STENT INSERTION    Anesthesia: Anesthesia type not filed in the log.     Surgeon(s):  Terri Banks MD    Staff:  [employer]: Mookie Hein RN     Estimated Blood Loss: * No values recorded between 2/28/2018 12:00 AM and 2/28/2018  9:08 AM * None    Complications: None    Specimens:   * No specimens in log *    Implants:  * No implants in log *      Drains:      Findings: right obstruction    Terri Banks MD  Date: 2/28/2018  Time: 9:08 AM

## 2018-02-28 NOTE — PROGRESS NOTES
at this time; fetal monitor remains intact with no contractions noted per Yvette Varma from [facility] department.

## 2018-02-28 NOTE — PROGRESS NOTES
Progress Note    SUBJECTIVE:  Patient seen for sepsis, UTI, right ureteral calculus with hydroureter, severe anemia, no change. STILL REFUSES CATHETER  ROS:   Constitutional: positive  for fevers, and positive for chills. Respiratory: positive for shortness of breath, negative for cough, and negative for wheezing  Cardiovascular: negative for chest pain, and negative for palpitations  Gastrointestinal: positive for abdominal pain, positive for nausea,negative for vomiting, negative for diarrhea, and negative for constipation     All other systems were reviewed with the patient and are negative unless otherwise stated in HPI    OBJECTIVE:    EXAM:  Vitals:    02/28/18 0621   BP: 122/72   Pulse: 122   Resp: 22   Temp: 100.5 °F (38.1 °C)   SpO2: 99%      Weight: 149 lb 11.2 oz (67.9 kg)    Height: 5' 5\" (165.1 cm)     GEN:   A & O x3, mild distress  EYES: No gross abnormalities. , PERRL and EOMI  NECK: normal, supple, no lymphadenopathy,   PULM: clear to auscultation bilaterally- no wheezes, rales or rhonchi, normal air movement, no respiratory distress  COR: regular rate & rhythm, no murmurs, no gallops and tachycardia  ABD:  tenderness present- in epigastric area, RUQ,  without rebound and guarding  EXT:   no cyanosis, clubbing or edema present    NEURO: negative  SKIN:  no rashes or significant lesions    microbiology: cultures pending    CBC with Differential:    Lab Results   Component Value Date    WBC 8.8 02/28/2018    RBC 3.04 02/28/2018    HGB 6.4 02/28/2018    HCT 21.5 02/28/2018     02/28/2018    MCV 70.7 02/28/2018    MCH 21.1 02/28/2018    MCHC 29.8 02/28/2018    RDW 18.7 02/28/2018    NRBC 1 02/28/2018    LYMPHOPCT 6 02/28/2018    MONOPCT 3 02/28/2018    BASOPCT 0 02/28/2018    MONOSABS 0.26 02/28/2018    LYMPHSABS 0.53 02/28/2018    EOSABS 0.00 02/28/2018    BASOSABS 0.00 02/28/2018    DIFFTYPE NOT REPORTED 02/28/2018     Hemoglobin/Hematocrit:    Lab Results   Component Value Date    HGB 6.4 fetal movement and cardiac activity seen. Heart rate is 144 beats per minute. Fetus is in a breech presentation. There is a grade 2 posterior placenta. Cervix is normal in length. The internal cervical os is closed. No endocervical fluid. Amniotic fluid is within normal limits. ANATOMY: Head: Normocephalic, no hydrocephalus, cerebellum and cisterna magna are normal Face: Nose and lips are within normal limits Heart: Four-chamber, normal situs Stomach: Fluid-filled, on the left Kidneys: Present bilaterally, no hydronephrosis Bladder: Fluid filled and midline Chest: Diaphragms intact, no pulmonary mass lesions seen Abdomen: Normal bowel echogenicity Spine: No evidence of spinal dysraphism Extremities: Normal x4 Umbilical cord: 3 vessel, normal fetal insertion BIOMETRY: BPD: 6.22 cm (normal) HC:  23.05 cm (normal) OFD:  8.07 cm AC:  21.26 cm (normal) FL:  4.41 cm(normal) EFW: 785 g (1 lb. 12 oz.) EFW percentile: 50% CI:  77.14 cm (normal) FL. /BPD:  70.86 cm (normal) HC/AC:  1.08 cm (normal) FL/AC:  20.75 cm (normal) FL/HC:  19.14 cm (normal) AUA: 25 weeks 1 day ODALIS ultrasound: 6/11/2018 1. Normal single living intrauterine gestation 2. Fetal anatomy within normal limits 3. Fetal biometry within normal limits. Final report electronically signed by Arabella Krishnamurthy on 2/27/2018 3:07 PM    Us Gallbladder Ruq    Result Date: 2/27/2018  FINAL REPORT EXAM:  US GALLBLADDER RUQ HISTORY:  ABD PAIN x 2 days, FEVER, NO RECENT SURGERY TECHNIQUE:  Greyscale and color doppler imaging of the abdomen was performed. PRIORS:  None. FINDINGS:  Liver length: Not enlarged. Liver appearance: Normal in echogenicity with smooth borders. No biliary ductal dilatation. CBD: 5 mm. Gallbladder: Small amount of gallbladder sludge present. No definite gallstones. Pancreas: Not visualized. Right kidney: Length: 13.3 cm. Appearance: Normal echogenicity. Marked hydroureteronephrosis hydronephrosis.  There is a 3 mm nonobstructive calculus at the right renal midpole. There is a 5 mm echogenic structure of the mid-distal right ureter with posterior shadowing, most likely representing a ureteral calculus. No perinephric fluid collection. Free fluid: No free fluid. Other findings: None     Impression:  5 mm obstructive calculus of the mid-distal right ureter with marked right hydroureteronephrosis. Small amount of gallbladder sludge. Electronically Signed By: Eron Nino   on  02/27/2018  21:15      ASSESSMENT:  Sepsis---secondary to urinary source, under control                               Right ureteral stone with hydroureter                               Anemia--severe, chronic            Hospital Problems:  Active Problems:    Anemia  Resolved Problems:    * No resolved hospital problems.  *      All Problems:  Patient Active Problem List   Diagnosis    Anemia       PLAN:  · Sepsis---continue IV fluids, antibiotics  · Stone---Dr KERR to stent  · Anemia---transfuse    HIGH RISK MEDS: none    Sonam Chilel M.D.

## 2018-02-28 NOTE — CONSULTS
[address] Consult Note    Patient:  Brenton Marc  MRN: [id number]    Reason for Consultation:  Fever, abd pain    Requesting Physician: Criss Heath    History Obtained From:  patient, spouse  PCP: No primary care provider on file. History of Present Illness: The patient is a 25 y.o. female who presents with having developed fever, chills ,nausea, vomiting, head and body aches on 2/25 PM. Symptoms persisted and she has been unable to keep anything down. Presented to ER and found to have hypochromic , microcytic anemia, hypotension, high fever, hyponatremia. Still having epigastric pain. Apparently has not seen her OB DR since 2/8 and is on no prenatal vitamins, etc.    Past Medical History:        Diagnosis Date    Anemia affecting third pregnancy        Past Surgical History:    History reviewed. No pertinent surgical history. Medications Prior to Admission:    Prior to Admission medications    Not on File       Allergies:  Patient has no known allergies. Social History:   TOBACCO:   reports that she has quit smoking. She smoked 0.50 packs per day. She has never used smokeless tobacco.  ETOH:   reports that she does not drink alcohol. OCCUPATION: none    Family History:   History reviewed. No pertinent family history.     Review of Systems:  Constitutional: positive for anorexia, chills, fatigue, fevers, malaise and sweats, negative for weight loss  Eyes: negative  Ears, nose, mouth, throat, and face: positive for nasal congestion and sore throat  Respiratory: negative for asthma, chronic bronchitis, cough, dyspnea on exertion, emphysema, hemoptysis, pleurisy/chest pain, pneumonia, shortness of breath, sputum, stridor and wheezing  Cardiovascular: negative for chest pain, chest pressure/discomfort, claudication, dyspnea, exertional chest pressure/discomfort, fatigue, irregular heart beat, lower extremity edema, near-syncope, orthopnea, palpitations, paroxysmal nocturnal

## 2018-02-28 NOTE — PROGRESS NOTES
PRBC's infusing well. Pt denies any SOB, nausea, chest pain or palpations at this time. BP 95/46, , Temp 99.6. Will continue to closely monitor. Call light in reach, finance at bedside.

## 2018-03-01 ENCOUNTER — APPOINTMENT (OUTPATIENT)
Dept: ULTRASOUND IMAGING | Age: 23
DRG: 720 | End: 2018-03-01
Payer: COMMERCIAL

## 2018-03-01 LAB
ABSOLUTE EOS #: <0.03 K/UL (ref 0–0.44)
ABSOLUTE IMMATURE GRANULOCYTE: 0.33 K/UL (ref 0–0.3)
ABSOLUTE LYMPH #: 0.96 K/UL (ref 1.1–3.7)
ABSOLUTE MONO #: 0.36 K/UL (ref 0.1–1.2)
ALBUMIN SERPL-MCNC: 2.6 G/DL (ref 3.5–5.2)
ALBUMIN/GLOBULIN RATIO: 0.7 (ref 1–2.5)
ALP BLD-CCNC: 77 U/L (ref 35–104)
ALT SERPL-CCNC: 11 U/L (ref 5–33)
ANION GAP SERPL CALCULATED.3IONS-SCNC: 18 MMOL/L (ref 9–17)
AST SERPL-CCNC: 33 U/L
BASOPHILS # BLD: 0 % (ref 0–2)
BASOPHILS ABSOLUTE: <0.03 K/UL (ref 0–0.2)
BILIRUB SERPL-MCNC: 0.39 MG/DL (ref 0.3–1.2)
BUN BLDV-MCNC: <2 MG/DL (ref 6–20)
BUN/CREAT BLD: ABNORMAL (ref 9–20)
CALCIUM SERPL-MCNC: 7.8 MG/DL (ref 8.6–10.4)
CHLORIDE BLD-SCNC: 103 MMOL/L (ref 98–107)
CO2: 14 MMOL/L (ref 20–31)
CREAT SERPL-MCNC: 0.36 MG/DL (ref 0.5–0.9)
DIFFERENTIAL TYPE: ABNORMAL
EOSINOPHILS RELATIVE PERCENT: 0 % (ref 1–4)
GFR AFRICAN AMERICAN: >60 ML/MIN
GFR NON-AFRICAN AMERICAN: >60 ML/MIN
GFR SERPL CREATININE-BSD FRML MDRD: ABNORMAL ML/MIN/{1.73_M2}
GFR SERPL CREATININE-BSD FRML MDRD: ABNORMAL ML/MIN/{1.73_M2}
GLUCOSE BLD-MCNC: 76 MG/DL (ref 70–99)
HCT VFR BLD CALC: 25.5 % (ref 36.3–47.1)
HEMOGLOBIN: 7.8 G/DL (ref 11.9–15.1)
IMMATURE GRANULOCYTES: 5 %
LACTIC ACID, WHOLE BLOOD: NORMAL MMOL/L (ref 0.7–2.1)
LACTIC ACID: 0.6 MMOL/L (ref 0.5–2.2)
LYMPHOCYTES # BLD: 15 % (ref 24–43)
MAGNESIUM: 1.6 MG/DL (ref 1.6–2.6)
MCH RBC QN AUTO: 21.9 PG (ref 25.2–33.5)
MCHC RBC AUTO-ENTMCNC: 30.6 G/DL (ref 28.4–34.8)
MCV RBC AUTO: 71.6 FL (ref 82.6–102.9)
MONOCYTES # BLD: 6 % (ref 3–12)
NRBC AUTOMATED: 0 PER 100 WBC
PDW BLD-RTO: 20 % (ref 11.8–14.4)
PLATELET # BLD: 164 K/UL (ref 138–453)
PLATELET ESTIMATE: ABNORMAL
PMV BLD AUTO: 9.5 FL (ref 8.1–13.5)
POTASSIUM SERPL-SCNC: 3.5 MMOL/L (ref 3.7–5.3)
PROCALCITONIN: 0.2 NG/ML
RBC # BLD: 3.56 M/UL (ref 3.95–5.11)
RBC # BLD: ABNORMAL 10*6/UL
SEG NEUTROPHILS: 75 % (ref 36–65)
SEGMENTED NEUTROPHILS ABSOLUTE COUNT: 4.93 K/UL (ref 1.5–8.1)
SODIUM BLD-SCNC: 135 MMOL/L (ref 135–144)
TOTAL PROTEIN: 6.1 G/DL (ref 6.4–8.3)
WBC # BLD: 6.6 K/UL (ref 3.5–11.3)
WBC # BLD: ABNORMAL 10*3/UL

## 2018-03-01 PROCEDURE — S0028 INJECTION, FAMOTIDINE, 20 MG: HCPCS | Performed by: UROLOGY

## 2018-03-01 PROCEDURE — 83735 ASSAY OF MAGNESIUM: CPT

## 2018-03-01 PROCEDURE — 2580000003 HC RX 258: Performed by: PHYSICIAN ASSISTANT

## 2018-03-01 PROCEDURE — 2580000003 HC RX 258: Performed by: UROLOGY

## 2018-03-01 PROCEDURE — 6360000002 HC RX W HCPCS: Performed by: ADVANCED PRACTICE MIDWIFE

## 2018-03-01 PROCEDURE — 83605 ASSAY OF LACTIC ACID: CPT

## 2018-03-01 PROCEDURE — 6370000000 HC RX 637 (ALT 250 FOR IP): Performed by: UROLOGY

## 2018-03-01 PROCEDURE — 80053 COMPREHEN METABOLIC PANEL: CPT

## 2018-03-01 PROCEDURE — 85025 COMPLETE CBC W/AUTO DIFF WBC: CPT

## 2018-03-01 PROCEDURE — 6360000002 HC RX W HCPCS: Performed by: UROLOGY

## 2018-03-01 PROCEDURE — 87328 CRYPTOSPORIDIUM AG IA: CPT

## 2018-03-01 PROCEDURE — 36415 COLL VENOUS BLD VENIPUNCTURE: CPT

## 2018-03-01 PROCEDURE — 87329 GIARDIA AG IA: CPT

## 2018-03-01 PROCEDURE — 2000000000 HC ICU R&B

## 2018-03-01 PROCEDURE — 76817 TRANSVAGINAL US OBSTETRIC: CPT

## 2018-03-01 PROCEDURE — 6370000000 HC RX 637 (ALT 250 FOR IP): Performed by: PHYSICIAN ASSISTANT

## 2018-03-01 PROCEDURE — 87493 C DIFF AMPLIFIED PROBE: CPT

## 2018-03-01 PROCEDURE — 87505 NFCT AGENT DETECTION GI: CPT

## 2018-03-01 PROCEDURE — 84145 PROCALCITONIN (PCT): CPT

## 2018-03-01 PROCEDURE — 2500000003 HC RX 250 WO HCPCS: Performed by: UROLOGY

## 2018-03-01 RX ORDER — PRENATAL WITH FERROUS FUM AND FOLIC ACID 3080; 920; 120; 400; 22; 1.84; 3; 20; 10; 1; 12; 200; 27; 25; 2 [IU]/1; [IU]/1; MG/1; [IU]/1; MG/1; MG/1; MG/1; MG/1; MG/1; MG/1; UG/1; MG/1; MG/1; MG/1; MG/1
1 TABLET ORAL DAILY
Status: DISCONTINUED | OUTPATIENT
Start: 2018-03-01 | End: 2018-03-04 | Stop reason: HOSPADM

## 2018-03-01 RX ORDER — FERROUS SULFATE 220 (44)/5
220 ELIXIR ORAL 2 TIMES DAILY
Status: DISCONTINUED | OUTPATIENT
Start: 2018-03-01 | End: 2018-03-02

## 2018-03-01 RX ORDER — POTASSIUM CHLORIDE 20MEQ/15ML
20 LIQUID (ML) ORAL DAILY
Status: DISCONTINUED | OUTPATIENT
Start: 2018-03-01 | End: 2018-03-01

## 2018-03-01 RX ORDER — HYDROCODONE BITARTRATE AND ACETAMINOPHEN 5; 325 MG/1; MG/1
2 TABLET ORAL EVERY 6 HOURS PRN
Status: DISCONTINUED | OUTPATIENT
Start: 2018-03-01 | End: 2018-03-04 | Stop reason: HOSPADM

## 2018-03-01 RX ORDER — POTASSIUM CHLORIDE 20 MEQ/1
20 TABLET, EXTENDED RELEASE ORAL ONCE
Status: COMPLETED | OUTPATIENT
Start: 2018-03-01 | End: 2018-03-01

## 2018-03-01 RX ADMIN — FAMOTIDINE 20 MG: 10 INJECTION, SOLUTION INTRAVENOUS at 08:49

## 2018-03-01 RX ADMIN — FAMOTIDINE 20 MG: 10 INJECTION, SOLUTION INTRAVENOUS at 20:25

## 2018-03-01 RX ADMIN — ONDANSETRON 4 MG: 2 INJECTION INTRAMUSCULAR; INTRAVENOUS at 09:12

## 2018-03-01 RX ADMIN — ACETAMINOPHEN 1000 MG: 500 TABLET, COATED ORAL at 17:11

## 2018-03-01 RX ADMIN — MORPHINE SULFATE 2 MG: 2 INJECTION, SOLUTION INTRAMUSCULAR; INTRAVENOUS at 07:14

## 2018-03-01 RX ADMIN — SODIUM CHLORIDE: 9 INJECTION, SOLUTION INTRAVENOUS at 17:27

## 2018-03-01 RX ADMIN — Medication 220 MG: at 09:05

## 2018-03-01 RX ADMIN — SODIUM CHLORIDE: 9 INJECTION, SOLUTION INTRAVENOUS at 10:15

## 2018-03-01 RX ADMIN — POTASSIUM CHLORIDE 20 MEQ: 1500 TABLET, EXTENDED RELEASE ORAL at 16:09

## 2018-03-01 RX ADMIN — DEXTROSE MONOHYDRATE 3.38 G: 50 INJECTION, SOLUTION INTRAVENOUS at 04:05

## 2018-03-01 RX ADMIN — Medication 500000 UNITS: at 12:00

## 2018-03-01 RX ADMIN — SODIUM CHLORIDE: 9 INJECTION, SOLUTION INTRAVENOUS at 02:30

## 2018-03-01 RX ADMIN — Medication 10 ML: at 07:14

## 2018-03-01 RX ADMIN — DEXTROSE MONOHYDRATE 3.38 G: 50 INJECTION, SOLUTION INTRAVENOUS at 12:26

## 2018-03-01 RX ADMIN — BENZOCAINE AND MENTHOL 1 LOZENGE: 15; 3.6 LOZENGE ORAL at 12:03

## 2018-03-01 RX ADMIN — Medication 500000 UNITS: at 21:16

## 2018-03-01 RX ADMIN — DEXTROSE MONOHYDRATE 3.38 G: 50 INJECTION, SOLUTION INTRAVENOUS at 20:25

## 2018-03-01 RX ADMIN — Medication 500000 UNITS: at 17:29

## 2018-03-01 RX ADMIN — Medication 220 MG: at 21:16

## 2018-03-01 RX ADMIN — ACETAMINOPHEN 1000 MG: 500 TABLET, COATED ORAL at 09:10

## 2018-03-01 ASSESSMENT — PAIN DESCRIPTION - PAIN TYPE
TYPE: ACUTE PAIN

## 2018-03-01 ASSESSMENT — PAIN DESCRIPTION - DESCRIPTORS
DESCRIPTORS: ACHING
DESCRIPTORS: ACHING;DISCOMFORT
DESCRIPTORS: ACHING;DISCOMFORT
DESCRIPTORS: ACHING
DESCRIPTORS: ACHING;DISCOMFORT
DESCRIPTORS: ACHING;DISCOMFORT
DESCRIPTORS: ACHING

## 2018-03-01 ASSESSMENT — PAIN SCALES - GENERAL
PAINLEVEL_OUTOF10: 6
PAINLEVEL_OUTOF10: 4
PAINLEVEL_OUTOF10: 5
PAINLEVEL_OUTOF10: 6
PAINLEVEL_OUTOF10: 4
PAINLEVEL_OUTOF10: 6
PAINLEVEL_OUTOF10: 5
PAINLEVEL_OUTOF10: 8

## 2018-03-01 ASSESSMENT — PAIN DESCRIPTION - LOCATION
LOCATION: ABDOMEN
LOCATION: ABDOMEN;FLANK
LOCATION: ABDOMEN

## 2018-03-01 ASSESSMENT — PAIN DESCRIPTION - ORIENTATION
ORIENTATION: RIGHT;LOWER
ORIENTATION: LOWER;RIGHT
ORIENTATION: RIGHT;LOWER

## 2018-03-01 ASSESSMENT — PAIN DESCRIPTION - FREQUENCY
FREQUENCY: CONTINUOUS

## 2018-03-01 NOTE — PROGRESS NOTES
Temp 100.5, pt refused to take any po medications at this time, states her throat hurts, will try again later and will continue to monitor temp.

## 2018-03-01 NOTE — CONSULTS
Urology Progress Note    Subjective:   Reports pain is present, but has improved. Not very talkative or responsive to questions. Objective:  Sleeping soundly upon entering the room. Temp. 100.5. BUN less than 2, creatinine 0.36, GFR >60. WBC 6.6. Lactic acid level 0.6. REVIEW OF SYSTEMS:  Constitutional: Negative for fever, chills and unexpected weight change. Respiratory: Negative for shortness of breath and wheezing. Cardiovascular: Negative for chest pain and palpitations. Gastrointestinal: Negative for nausea or vomiting. Endocrine: Negative for polydipsia and polyuria. Genitourinary: Positive for right flank pain. Musculoskeletal: Negative for myalgias and joint swelling. Skin: Negative for rash and wound. Neurological: Negative for dizziness and headaches. Hematological: Negative for adenopathy. Does not bruise/bleed easily. PHYSICAL EXAM:  Constitutional: Patient resting comfortably, in no acute distress. Neuro: Alert and oriented to person place and time. Cranial nerves grossly intact. Psych: Mood and affect normal.  Skin: Warm, dry  HEENT: normocephalic, atraumatic  Lymphatics: No palpable lymphadenopathy  Lungs: Respiratory effort normal, unlabored  Cardiovascular:  Normal peripheral pulses  Abdomen: Soft, non-tender, non-distended with no organomegaly or palpable masses. : No CVA tenderness bilat. Bladder non-tender and not distended. Pelvic: Deferred  Extremities: Calves are non-tender, equal in circumference bilat without swelling, warmth, or erythema.      Patient Vitals for the past 24 hrs:   BP Temp Temp src Pulse Resp SpO2 Weight   03/01/18 1108 (!) 103/53 98.9 °F (37.2 °C) Temporal 99 20 96 % -   03/01/18 1005 - 99.8 °F (37.7 °C) Temporal 105 16 - -   03/01/18 0908 - 101.7 °F (38.7 °C) Temporal - - - -   03/01/18 0901 119/61 - - 112 16 - -   03/01/18 0852 - 100.5 °F (38.1 °C) Temporal 112 - - -   03/01/18 0801 119/72 - - 105 16 - -   03/01/18 0714 108/65 99.8 °F

## 2018-03-01 NOTE — PROGRESS NOTES
Morphology 1+    Comprehensive Metabolic Panel   Result Value Ref Range    Glucose 114 (H) 70 - 99 mg/dL    BUN 5 (L) 6 - 20 mg/dL    CREATININE 0.48 (L) 0.50 - 0.90 mg/dL    Bun/Cre Ratio 10 9 - 20    Calcium 8.6 8.6 - 10.4 mg/dL    Sodium 129 (L) 135 - 144 mmol/L    Potassium 3.3 (L) 3.7 - 5.3 mmol/L    Chloride 98 98 - 107 mmol/L    CO2 16 (L) 20 - 31 mmol/L    Anion Gap 15 9 - 17 mmol/L    Alkaline Phosphatase 77 35 - 104 U/L    ALT 13 5 - 33 U/L    AST 22 <32 U/L    Total Bilirubin 0.31 0.3 - 1.2 mg/dL    Total Protein 7.2 6.4 - 8.3 g/dL    Alb 3.1 (L) 3.5 - 5.2 g/dL    Albumin/Globulin Ratio 0.8 (L) 1.0 - 2.5    GFR Non-African American >60 >60 mL/min    GFR African American >60 >60 mL/min    GFR Comment          GFR Staging         Strep A DNA probe, amplification   Result Value Ref Range    Specimen Description       . TH[facility]AT SWAB [facility]    Specimen Description , [place] [id number] ([phone]     Special Requests NOT REPORTED     Direct Exam       Negative: Specimen negative for Streptococcus pyogenes by DNA amplification.     Direct Exam       Performed at [address] [place] [address] [phone]    Status FINAL 02/28/2018    UA W/ Reflex Culture   Result Value Ref Range    Color, UA YELLOW YEL    Turbidity UA CLEAR CLEAR    Glucose, Ur NEGATIVE NEG    Bilirubin Urine NEGATIVE NEG    Ketones, Urine 3+ (A) NEG    Specific Gravity, UA 1.015 1.010 - 1.020    Urine Hgb NEGATIVE NEG    pH, UA 6.0 5.0 - 9.0    Protein, UA NEGATIVE NEG    Urobilinogen, Urine Normal NORM    Nitrite, Urine NEGATIVE NEG    Leukocyte Esterase, Urine SMALL (A) NEG    Urinalysis Comments NOT REPORTED    Mononucleosis Screen   Result Value Ref Range    Mononucleosis Screen NEGATIVE NEG   Microscopic Urinalysis   Result Value Ref Range    -          WBC, UA 2 TO 5 0 - 5 /HPF    RBC, UA 0 TO 2 0 - 2 /HPF    Casts UA NOT REPORTED /LPF    Crystals UA NOT REPORTED Albumin/Globulin Ratio 0.7 (L) 1.0 - 2.5    GFR Non-African American >60 >60 mL/min    GFR African American >60 >60 mL/min    GFR Comment          GFR Staging         Magnesium   Result Value Ref Range    Magnesium 1.6 1.6 - 2.6 mg/dL   Procalcitonin   Result Value Ref Range    Procalcitonin 0.20 (H) <0.09 ng/mL   Lactic acid, plasma   Result Value Ref Range    Lactic Acid 0.6 0.5 - 2.2 mmol/L    Lactic Acid, Whole Blood NOT REPORTED 0.7 - 2.1 mmol/L   EKG 12 lead   Result Value Ref Range    Ventricular Rate 115 BPM    Atrial Rate 115 BPM    P-R Interval 150 ms    QRS Duration 78 ms    Q-T Interval 316 ms    QTc Calculation (Bazett) 437 ms    P Axis 60 degrees    R Axis 57 degrees    T Axis 17 degrees   TYPE AND SCREEN   Result Value Ref Range    Expiration Date 03/02/2018     Arm Band Number [id number]     ABO/Rh A NEGATIVE     Antibody Screen NEGATIVE     Unit Number [id number]     Product Code Leukocyte Reduced Red Cell     Unit Divison 0     Dispense Status TRANSFUSED     Transfusion Status OK TO TRANSFUSE     Crossmatch Result COMPATIBLE     Unit Number [id number]     Product Code Leukocyte Reduced Red Cell     Unit Divison 0     Dispense Status ISSUED     Transfusion Status OK TO TRANSFUSE     Crossmatch Result COMPATIBLE        ASSESSMENT :  To follow up at next scheduled appt in office    Patient Active Hospital Problem List:   Severe sepsis ([address] (2/28/2018)     Anemia (2/27/2018)    hgb 7.7 yesterday, 7.8 today   Hydronephrosis of right kidney (2/28/2018)       Cannabis abuse (2/28/2018)      Hyponatremia (2/28/2018)   stable   Hypokalemia (2/28/2018)    stable      PLAN:  Will change to oral pain medication, pregnancy is stable, further anemia treatment will be considered with iron and stool softeners. Pt is asking re: homegoing. That will be dependent on sepsis resolution/ medicine evaluation.   Patient will obviously need followed closely with the remainder of this pregnancy

## 2018-03-01 NOTE — PROGRESS NOTES
Pt able to take po iron and po tylenol, but c/o nausea few minutes after taking, zofran given IV per prn orders. Pt refused nystain swish and swallow at this time, states she will try to take it later and pt refused prenatal vitamin at this time, states she will try to take it later today.

## 2018-03-02 LAB
ABSOLUTE EOS #: 0 K/UL (ref 0–0.44)
ABSOLUTE IMMATURE GRANULOCYTE: 0.09 K/UL (ref 0–0.3)
ABSOLUTE LYMPH #: 1.17 K/UL (ref 1.1–3.7)
ABSOLUTE MONO #: 0.18 K/UL (ref 0.1–1.2)
ALBUMIN SERPL-MCNC: 2.4 G/DL (ref 3.5–5.2)
ALBUMIN/GLOBULIN RATIO: 0.7 (ref 1–2.5)
ALP BLD-CCNC: 90 U/L (ref 35–104)
ALT SERPL-CCNC: 11 U/L (ref 5–33)
ANION GAP SERPL CALCULATED.3IONS-SCNC: 15 MMOL/L (ref 9–17)
AST SERPL-CCNC: 32 U/L
BASOPHILS # BLD: 0 % (ref 0–2)
BASOPHILS ABSOLUTE: 0 K/UL (ref 0–0.2)
BILIRUB SERPL-MCNC: 0.56 MG/DL (ref 0.3–1.2)
BUN BLDV-MCNC: <2 MG/DL (ref 6–20)
BUN/CREAT BLD: ABNORMAL (ref 9–20)
C DIFFICILE TOXINS, PCR: NORMAL
CALCIUM SERPL-MCNC: 7.9 MG/DL (ref 8.6–10.4)
CAMPYLOBACTER PCR: NORMAL
CHLORIDE BLD-SCNC: 104 MMOL/L (ref 98–107)
CO2: 18 MMOL/L (ref 20–31)
CREAT SERPL-MCNC: 0.28 MG/DL (ref 0.5–0.9)
DIFFERENTIAL TYPE: ABNORMAL
DIRECT EXAM: NORMAL
EOSINOPHILS RELATIVE PERCENT: 0 % (ref 1–4)
GFR AFRICAN AMERICAN: >60 ML/MIN
GFR NON-AFRICAN AMERICAN: >60 ML/MIN
GFR SERPL CREATININE-BSD FRML MDRD: ABNORMAL ML/MIN/{1.73_M2}
GFR SERPL CREATININE-BSD FRML MDRD: ABNORMAL ML/MIN/{1.73_M2}
GLUCOSE BLD-MCNC: 83 MG/DL (ref 70–99)
HCT VFR BLD CALC: 25.6 % (ref 36.3–47.1)
HEMOGLOBIN: 7.8 G/DL (ref 11.9–15.1)
IMMATURE GRANULOCYTES: 2 %
LYMPHOCYTES # BLD: 26 % (ref 24–43)
Lab: NORMAL
MAGNESIUM: 1.6 MG/DL (ref 1.6–2.6)
MCH RBC QN AUTO: 21.8 PG (ref 25.2–33.5)
MCHC RBC AUTO-ENTMCNC: 30.5 G/DL (ref 28.4–34.8)
MCV RBC AUTO: 71.5 FL (ref 82.6–102.9)
MONOCYTES # BLD: 4 % (ref 3–12)
MORPHOLOGY: ABNORMAL
NRBC AUTOMATED: 0 PER 100 WBC
PDW BLD-RTO: 20.3 % (ref 11.8–14.4)
PLATELET # BLD: 155 K/UL (ref 138–453)
PLATELET ESTIMATE: ABNORMAL
PMV BLD AUTO: 9.5 FL (ref 8.1–13.5)
POTASSIUM SERPL-SCNC: 3 MMOL/L (ref 3.7–5.3)
POTASSIUM SERPL-SCNC: 4 MMOL/L (ref 3.7–5.3)
RBC # BLD: 3.58 M/UL (ref 3.95–5.11)
RBC # BLD: ABNORMAL 10*6/UL
SALMONELLA PCR: NORMAL
SEG NEUTROPHILS: 68 % (ref 36–65)
SEGMENTED NEUTROPHILS ABSOLUTE COUNT: 3.06 K/UL (ref 1.5–8.1)
SHIGATOXIN GENE PCR: NORMAL
SHIGELLA SP PCR: NORMAL
SODIUM BLD-SCNC: 137 MMOL/L (ref 135–144)
SPECIMEN DESCRIPTION: NORMAL
SPECIMEN: NORMAL
STATUS: NORMAL
TOTAL PROTEIN: 5.9 G/DL (ref 6.4–8.3)
WBC # BLD: 4.5 K/UL (ref 3.5–11.3)
WBC # BLD: ABNORMAL 10*3/UL

## 2018-03-02 PROCEDURE — 6370000000 HC RX 637 (ALT 250 FOR IP): Performed by: ADVANCED PRACTICE MIDWIFE

## 2018-03-02 PROCEDURE — 6370000000 HC RX 637 (ALT 250 FOR IP): Performed by: PHYSICIAN ASSISTANT

## 2018-03-02 PROCEDURE — 2580000003 HC RX 258: Performed by: INTERNAL MEDICINE

## 2018-03-02 PROCEDURE — 6360000002 HC RX W HCPCS: Performed by: PHYSICIAN ASSISTANT

## 2018-03-02 PROCEDURE — 85025 COMPLETE CBC W/AUTO DIFF WBC: CPT

## 2018-03-02 PROCEDURE — 84132 ASSAY OF SERUM POTASSIUM: CPT

## 2018-03-02 PROCEDURE — 99231 SBSQ HOSP IP/OBS SF/LOW 25: CPT | Performed by: ADVANCED PRACTICE MIDWIFE

## 2018-03-02 PROCEDURE — 2580000003 HC RX 258: Performed by: PHYSICIAN ASSISTANT

## 2018-03-02 PROCEDURE — 2000000000 HC ICU R&B

## 2018-03-02 PROCEDURE — 36415 COLL VENOUS BLD VENIPUNCTURE: CPT

## 2018-03-02 PROCEDURE — 2500000003 HC RX 250 WO HCPCS: Performed by: UROLOGY

## 2018-03-02 PROCEDURE — 2580000003 HC RX 258: Performed by: UROLOGY

## 2018-03-02 PROCEDURE — S0028 INJECTION, FAMOTIDINE, 20 MG: HCPCS | Performed by: UROLOGY

## 2018-03-02 PROCEDURE — 6360000002 HC RX W HCPCS: Performed by: INTERNAL MEDICINE

## 2018-03-02 PROCEDURE — 6370000000 HC RX 637 (ALT 250 FOR IP): Performed by: UROLOGY

## 2018-03-02 PROCEDURE — 83735 ASSAY OF MAGNESIUM: CPT

## 2018-03-02 PROCEDURE — 6360000002 HC RX W HCPCS: Performed by: UROLOGY

## 2018-03-02 PROCEDURE — 80053 COMPREHEN METABOLIC PANEL: CPT

## 2018-03-02 RX ORDER — FERROUS SULFATE 325(65) MG
325 TABLET ORAL 2 TIMES DAILY WITH MEALS
Status: DISCONTINUED | OUTPATIENT
Start: 2018-03-02 | End: 2018-03-04 | Stop reason: HOSPADM

## 2018-03-02 RX ORDER — POTASSIUM CHLORIDE 7.45 MG/ML
10 INJECTION INTRAVENOUS PRN
Status: DISCONTINUED | OUTPATIENT
Start: 2018-03-02 | End: 2018-03-04 | Stop reason: HOSPADM

## 2018-03-02 RX ORDER — SODIUM CHLORIDE AND POTASSIUM CHLORIDE .9; .15 G/100ML; G/100ML
SOLUTION INTRAVENOUS CONTINUOUS
Status: DISCONTINUED | OUTPATIENT
Start: 2018-03-02 | End: 2018-03-04 | Stop reason: HOSPADM

## 2018-03-02 RX ORDER — DIAPER,BRIEF,INFANT-TODD,DISP
EACH MISCELLANEOUS 3 TIMES DAILY
Status: DISCONTINUED | OUTPATIENT
Start: 2018-03-02 | End: 2018-03-04 | Stop reason: HOSPADM

## 2018-03-02 RX ADMIN — HYDROCORTISONE: 1 CREAM TOPICAL at 21:00

## 2018-03-02 RX ADMIN — Medication 10 ML: at 10:12

## 2018-03-02 RX ADMIN — ACETAMINOPHEN 1000 MG: 500 TABLET, COATED ORAL at 00:04

## 2018-03-02 RX ADMIN — FAMOTIDINE 20 MG: 10 INJECTION, SOLUTION INTRAVENOUS at 20:06

## 2018-03-02 RX ADMIN — DEXTROSE MONOHYDRATE 3.38 G: 50 INJECTION, SOLUTION INTRAVENOUS at 11:52

## 2018-03-02 RX ADMIN — VITAMIN A, VITAMIN C, VITAMIN D-3, VITAMIN E, VITAMIN B-1, VITAMIN B-2, NIACIN, VITAMIN B-6, CALCIUM, IRON, ZINC, COPPER 1 TABLET: 4000; 120; 400; 22; 1.84; 3; 20; 10; 1; 12; 200; 27; 25; 2 TABLET ORAL at 10:12

## 2018-03-02 RX ADMIN — HYDROCORTISONE: 1 CREAM TOPICAL at 14:10

## 2018-03-02 RX ADMIN — Medication 500000 UNITS: at 20:06

## 2018-03-02 RX ADMIN — ACETAMINOPHEN 1000 MG: 500 TABLET, COATED ORAL at 07:22

## 2018-03-02 RX ADMIN — POTASSIUM CHLORIDE AND SODIUM CHLORIDE: 900; 150 INJECTION, SOLUTION INTRAVENOUS at 07:20

## 2018-03-02 RX ADMIN — Medication 500000 UNITS: at 10:12

## 2018-03-02 RX ADMIN — POTASSIUM CHLORIDE: 149 INJECTION, SOLUTION, CONCENTRATE INTRAVENOUS at 07:12

## 2018-03-02 RX ADMIN — Medication 500000 UNITS: at 13:56

## 2018-03-02 RX ADMIN — Medication 220 MG: at 10:12

## 2018-03-02 RX ADMIN — FERROUS SULFATE TAB 325 MG (65 MG ELEMENTAL FE) 325 MG: 325 (65 FE) TAB at 17:31

## 2018-03-02 RX ADMIN — Medication 500000 UNITS: at 17:31

## 2018-03-02 RX ADMIN — HYDROCODONE BITARTRATE AND ACETAMINOPHEN 2 TABLET: 5; 325 TABLET ORAL at 14:10

## 2018-03-02 RX ADMIN — POTASSIUM CHLORIDE AND SODIUM CHLORIDE: 900; 150 INJECTION, SOLUTION INTRAVENOUS at 17:31

## 2018-03-02 RX ADMIN — DEXTROSE MONOHYDRATE 3.38 G: 50 INJECTION, SOLUTION INTRAVENOUS at 20:07

## 2018-03-02 RX ADMIN — FAMOTIDINE 20 MG: 10 INJECTION, SOLUTION INTRAVENOUS at 10:12

## 2018-03-02 RX ADMIN — DEXTROSE MONOHYDRATE 3.38 G: 50 INJECTION, SOLUTION INTRAVENOUS at 03:17

## 2018-03-02 RX ADMIN — IRON SUCROSE 300 MG: 20 INJECTION, SOLUTION INTRAVENOUS at 11:05

## 2018-03-02 RX ADMIN — SODIUM CHLORIDE: 9 INJECTION, SOLUTION INTRAVENOUS at 03:18

## 2018-03-02 ASSESSMENT — PAIN DESCRIPTION - PAIN TYPE
TYPE: ACUTE PAIN

## 2018-03-02 ASSESSMENT — PAIN SCALES - GENERAL
PAINLEVEL_OUTOF10: 7
PAINLEVEL_OUTOF10: 3
PAINLEVEL_OUTOF10: 7
PAINLEVEL_OUTOF10: 5
PAINLEVEL_OUTOF10: 4
PAINLEVEL_OUTOF10: 5
PAINLEVEL_OUTOF10: 6
PAINLEVEL_OUTOF10: 0
PAINLEVEL_OUTOF10: 6
PAINLEVEL_OUTOF10: 4

## 2018-03-02 ASSESSMENT — PAIN DESCRIPTION - DESCRIPTORS
DESCRIPTORS: ACHING

## 2018-03-02 ASSESSMENT — PAIN DESCRIPTION - ORIENTATION
ORIENTATION: RIGHT
ORIENTATION: RIGHT;LOWER;MID
ORIENTATION: RIGHT
ORIENTATION: RIGHT;LOWER
ORIENTATION: RIGHT

## 2018-03-02 ASSESSMENT — PAIN DESCRIPTION - LOCATION
LOCATION: ABDOMEN

## 2018-03-02 NOTE — PROGRESS NOTES
Pt resting quietly in bed with eyes shut. Respirations even and unlabored while on room air. Per pt during AM assessment she did not get much sleep during the night. Will let pt rest at this time. Will hold morning medications until pt is awake. Call light in reach. Will continue to monitor.

## 2018-03-02 NOTE — PROGRESS NOTES
Facsimile Transmission Cover Sheet    Information contained in this transmission is for the sole use of the intended recipients and may contain confidential and privileged information. Any unauthorized review, use, disclosure or distribution is prohibited. If you are not the intended recipient, please contact the sender and destroy all copies of the original message. Disclosure is made for the purpose of healthcare operations and continuity of care. To: __[place]________________________    From: ICU    Fax Number: [phone]    Sender:_Izzy Roy RN_________________ Phone Number:_[phone]____________      This request is: Urgent - No    Information and/or questions regarding patient condition:    Daniel June,   Regarding SH, in , her vital signs are stable, she is currently receiving the IV Iron Sucrose infusion as ordered by medical. Do you want to move the pt out of ICU today? Thank you.   Katelynn Estrada RN

## 2018-03-02 NOTE — PLAN OF CARE
Problem: Pain:  Goal: Pain level will decrease  Pain level will decrease   Outcome: Ongoing  Pain assessed every four hours and as needed using 0-10 pain scale. Pt educated on scale and uses scale appropriately. Encourage pt to notify staff of pain before pain becomes uncontrollable. Correlate periods of heavy activity after pain medication administration. Use pharmacological and non pharmacological methods for pain relief such as: warm blankets, ice, television, reading, or rest.     Problem: Infection:  Goal: Will remain free from infection  Will remain free from infection   Outcome: Ongoing  White blood cell count normal. Continue IV antibiotics per orders. Problem: Daily Care:  Goal: Daily care needs are met  Daily care needs are met   Outcome: Ongoing  Needs assessed hourly, pt alert and oriented able to express needs or meet needs independently. Problem: Discharge Planning:  Goal: Patients continuum of care needs are met  Patients continuum of care needs are met   Outcome: Ongoing  Pt plans to return home upon discharge.

## 2018-03-02 NOTE — PROGRESS NOTES
Department of Obstetrics and Gynecology  Labor and Delivery  [facility] Outpatient Note      SUBJECTIVE:  26 yo [id number] [facility] female 26 weeks gestation, admitted for sepsis, presumed urinary and severe anemia, presumed iron deficiency. Currently stable, only c/o fatigue. Is ambulatory but tired and requested bedside commode last night but \"promise I'll get up more today\"    OBJECTIVE: cbc is stable, remains anemic but stable hgb at 7.8, wbc normal.  Urine is clear and QS with right ureteral stent in place. Pt is congested but lungs are clear. Abdomen is gravid 32 FH but soft and nontender. Still with fevers intermittanly. The patient is a 25 y.o. female at Unknown. OB History      Para Term  AB Living    4 3 2     3    SAB TAB Ectopic Molar Multiple Live Births              3      Patient presents with a chief complaint fatigue    Estimated Due Date: Estimated Date of Delivery: None noted. Past Medical History:   Diagnosis Date    Anemia affecting third pregnancy        Vitals:  /66   Pulse 100   Temp 97.9 °F (36.6 °C) (Temporal)   Resp 16   Ht 5' 5\" (1.651 m)   Wt 150 lb 3.2 oz (68.1 kg)   LMP 2017 (Approximate)   SpO2 96%   Breastfeeding? No   BMI 24.99 kg/m²     CONSTITUTIONAL:  awake, alert, cooperative, no apparent distress, and appears stated age  ABDOMEN:  Gravid,  normal bowel sounds, soft, non-distended, non-tender, no masses palpated, no hepatosplenomegally    NEUROLOGIC:  Awake, alert, oriented to name, place and time. gait is normal.        Membranes:  Intact    Fetal heart rate:    Baseline: wnl bpm,            Contraction frequency: denies  DATA:  Results for orders placed or performed during the hospital encounter of 18   Rapid influenza A/B antigens   Result Value Ref Range    Specimen Description . NASOPHARYNGEAL SWAB     Special Requests NOT REPORTED     Direct Exam       Presumptive negative for the presence of Influenza A and Influenza B Information NOT REPORTED    Lactic acid, plasma   Result Value Ref Range    Lactic Acid 0.5 0.5 - 2.2 mmol/L    Lactic Acid, Whole Blood NOT REPORTED 0.7 - 2.1 mmol/L   Blood Gas, Arterial   Result Value Ref Range    pH, Arterial 7.361 7.35 - 7.45    pCO2, Arterial 23.7 (L) 35 - 45 mmHg    pO2, Arterial 106.3 (H) 80 - 100 mmHg    HCO3, Arterial 13.1 (L) 22 - 26 mmol/L    Positive Base Excess, Art NOT REPORTED 0.0 - 2.0 mmol/L    Negative Base Excess, Art 10.1 (H) 0.0 - 2.0 mmol/L    O2 Sat, Arterial 97.8 95 - 98 %    Total Hb NOT REPORTED 12.0 - 16.0 g/dl    Oxyhemoglobin NOT REPORTED 95.0 - 98.0 %    Carboxyhemoglobin NOT REPORTED 0 - 5 %    Methemoglobin NOT REPORTED 0.0 - 1.9 %    Pt Temp 37.0     pH, Art, Temp Adj NOT REPORTED 7.350 - 7.450    pCO2, Art, Temp Adj NOT REPORTED     pO2, Art, Temp Adj NOT REPORTED 80.0 - 100.0 mmHg    O2 Device/Flow/% ROOM AIR     Respiratory Rate NOT REPORTED     Dionte Test PASS     Sample Site Right Radial Artery     Pt.  Position SEMI-FOWLERS     Mode NOT REPORTED     Set Rate NOT REPORTED     Total Rate NOT REPORTED     VT NOT REPORTED     FIO2 NOT REPORTED     Peep/Cpap NOT REPORTED     PSV NOT REPORTED     Text for Respiratory NOT REPORTED     NOTIFICATION NOT REPORTED     NOTIFICATION TIME NOT REPORTED    Procalcitonin   Result Value Ref Range    Procalcitonin 0.14 (H) <0.09 ng/mL   Amylase   Result Value Ref Range    Amylase 40 28 - 100 U/L   Lipase   Result Value Ref Range    Lipase 17 13 - 60 U/L   Iron and TIBC   Result Value Ref Range    Iron 13 (L) 37 - 145 ug/dL    TIBC 455 (H) 250 - 450 ug/dL    Iron Saturation 3 (L) 20 - 55 %    UIBC 441.7 (H) 112 - 347 ug/dL   Ferritin   Result Value Ref Range    Ferritin 20 13 - 150 ug/L   Reticulocytes   Result Value Ref Range    Retic % 1.5 0.5 - 1.9 %    Absolute Retic # 0.049 0.030 - 0.080 M/uL    Immature Retic Fract 29.500 (H) 2.7 - 18.3 %    Retic Hemoglobin 23.5 (L) 28.2 - 35.7 pg   Vitamin B12 & Folate   Result Value Ref Range    Vitamin B-12 261 211 - 946 pg/mL    Folate 18.2 >4.8 ng/mL   T4, Free   Result Value Ref Range    Thyroxine, Free 1.04 0.93 - 1.70 ng/dL   TSH without Reflex   Result Value Ref Range    TSH 0.17 (L) 0.30 - 5.00 mIU/L   Lactate, Sepsis   Result Value Ref Range    Lactic Acid, Sepsis 0.5 0.5 - 1.9 mmol/L    Lactic Acid, Sepsis, Whole Blood NOT REPORTED 0.5 - 1.9 mmol/L   Comprehensive Metabolic Panel w/ Reflex to MG   Result Value Ref Range    Glucose 79 70 - 99 mg/dL    BUN 3 (L) 6 - 20 mg/dL    CREATININE 0.29 (L) 0.50 - 0.90 mg/dL    Bun/Cre Ratio 10 9 - 20    Calcium 7.4 (L) 8.6 - 10.4 mg/dL    Sodium 134 (L) 135 - 144 mmol/L    Potassium 3.6 (L) 3.7 - 5.3 mmol/L    Chloride 105 98 - 107 mmol/L    CO2 13 (L) 20 - 31 mmol/L    Anion Gap 16 9 - 17 mmol/L    Alkaline Phosphatase 68 35 - 104 U/L    ALT 10 5 - 33 U/L    AST 18 <32 U/L    Total Bilirubin 0.25 (L) 0.3 - 1.2 mg/dL    Total Protein 5.8 (L) 6.4 - 8.3 g/dL    Alb 2.5 (L) 3.5 - 5.2 g/dL    Albumin/Globulin Ratio 0.8 (L) 1.0 - 2.5    GFR Non-African American >60 >60 mL/min    GFR African American >60 >60 mL/min    GFR Comment          GFR Staging         PRENATAL PROFILE I   Result Value Ref Range    WBC 8.8 3.5 - 11.3 k/uL    RBC 3.04 (L) 3.95 - 5.11 m/uL    Hemoglobin 6.4 (LL) 11.9 - 15.1 g/dL    Hematocrit 21.5 (L) 36.3 - 47.1 %    MCV 70.7 (L) 82.6 - 102.9 fL    MCH 21.1 (L) 25.2 - 33.5 pg    MCHC 29.8 28.4 - 34.8 g/dL    RDW 18.7 (H) 11.8 - 14.4 %    Platelets 711 965 - 372 k/uL    MPV 9.8 8.1 - 13.5 fL    NRBC Automated 0.3 (H) 0.0 per 100 WBC    Rubella Antibody, IGG 3.0 IU/mL    Hepatitis B Surface Ag NONREACTIVE NR    Differential Type NOT REPORTED     WBC Morphology NOT REPORTED     RBC Morphology NOT REPORTED     Platelet Estimate NOT REPORTED     Seg Neutrophils 90 (H) 36 - 65 %    Lymphocytes 6 (L) 24 - 43 %    Monocytes 3 3 - 12 %    Eosinophils % 0 (L) 1 - 4 %    Immature Granulocytes 1 (H) 0 %    Basophils 0 0 - 2 %    nRBC 1 0 - 5 per Neutrophils 75 (H) 36 - 65 %    Lymphocytes 15 (L) 24 - 43 %    Monocytes 6 3 - 12 %    Eosinophils % 0 (L) 1 - 4 %    Basophils 0 0 - 2 %    Immature Granulocytes 5 (H) 0 %    Segs Absolute 4.93 1.50 - 8.10 k/uL    Absolute Lymph # 0.96 (L) 1.10 - 3.70 k/uL    Absolute Mono # 0.36 0.10 - 1.20 k/uL    Absolute Eos # <0.03 0.00 - 0.44 k/uL    Basophils # <0.03 0.00 - 0.20 k/uL    Absolute Immature Granulocyte 0.33 (H) 0.00 - 0.30 k/uL   Comprehensive Metabolic Panel w/ Reflex to MG   Result Value Ref Range    Glucose 76 70 - 99 mg/dL    BUN <2 (L) 6 - 20 mg/dL    CREATININE 0.36 (L) 0.50 - 0.90 mg/dL    Bun/Cre Ratio CANNOT BE CALCULATED 9 - 20    Calcium 7.8 (L) 8.6 - 10.4 mg/dL    Sodium 135 135 - 144 mmol/L    Potassium 3.5 (L) 3.7 - 5.3 mmol/L    Chloride 103 98 - 107 mmol/L    CO2 14 (L) 20 - 31 mmol/L    Anion Gap 18 (H) 9 - 17 mmol/L    Alkaline Phosphatase 77 35 - 104 U/L    ALT 11 5 - 33 U/L    AST 33 (H) <32 U/L    Total Bilirubin 0.39 0.3 - 1.2 mg/dL    Total Protein 6.1 (L) 6.4 - 8.3 g/dL    Alb 2.6 (L) 3.5 - 5.2 g/dL    Albumin/Globulin Ratio 0.7 (L) 1.0 - 2.5    GFR Non-African American >60 >60 mL/min    GFR African American >60 >60 mL/min    GFR Comment          GFR Staging         Magnesium   Result Value Ref Range    Magnesium 1.6 1.6 - 2.6 mg/dL   Procalcitonin   Result Value Ref Range    Procalcitonin 0.20 (H) <0.09 ng/mL   Lactic acid, plasma   Result Value Ref Range    Lactic Acid 0.6 0.5 - 2.2 mmol/L    Lactic Acid, Whole Blood NOT REPORTED 0.7 - 2.1 mmol/L   CBC auto differential   Result Value Ref Range    WBC 4.5 3.5 - 11.3 k/uL    RBC 3.58 (L) 3.95 - 5.11 m/uL    Hemoglobin 7.8 (L) 11.9 - 15.1 g/dL    Hematocrit 25.6 (L) 36.3 - 47.1 %    MCV 71.5 (L) 82.6 - 102.9 fL    MCH 21.8 (L) 25.2 - 33.5 pg    MCHC 30.5 28.4 - 34.8 g/dL    RDW 20.3 (H) 11.8 - 14.4 %    Platelets 054 726 - 164 k/uL    MPV 9.5 8.1 - 13.5 fL    NRBC Automated 0.0 0.0 per 100 WBC    Differential Type NOT REPORTED     WBC

## 2018-03-02 NOTE — PROGRESS NOTES
Nutrition Assessment    Type and Reason for Visit: Reassess    Nutrition Recommendations:  Ensure Clear    Malnutrition Assessment:  · Malnutrition Status: At risk for malnutrition  · Context: Acute illness or injury  · Findings of the 6 clinical characteristics of malnutrition (Minimum of 2 out of 6 clinical characteristics is required to make the diagnosis of moderate or severe Protein Calorie Malnutrition based on AND/ASPEN Guidelines):  1. Energy Intake-Less than or equal to 50%, greater than or equal to 5 days    2. Weight Loss-No significant weight loss,    3. Fat Loss-No significant subcutaneous fat loss,    4. Muscle Loss-No significant muscle mass loss,    5. Fluid Accumulation-No significant fluid accumulation,    6.  Strength-Not measured    Nutrition Assessment:  · Subjective Assessment: c/o throat soreness. Not eating well.   Doubts she would like Ensure, but will try Ensure Clear  · Nutrition-Focused Physical Findings: 26 weeks pregnant  · Wound Type: None  · Current Nutrition Therapies:  · Oral Diet Orders: Full Liquid (NPO for test/procedure)   · Oral Diet intake: 1-25%  · Oral Nutrition Supplement (ONS) Orders: Clear Liquid Oral Supplement (starting today)  · Anthropometric Measures:  · Ht: 5' 5\" (165.1 cm)   · Current Body Wt: 150 lb 3.2 oz (68.1 kg)  · Admission Body Wt: 145 lb (65.8 kg)  · Usual Body Wt: 145 lb (65.8 kg) (pregravid)  · BMI Classification: BMI 25.0 - 29.9 Overweight  · Comparative Standards (Estimated Nutrition Needs):  · Estimated Daily Total Kcal: ~2000  · Estimated Daily Protein (g): 66-79    Lab Results   Component Value Date     03/02/2018    K 3.0 (L) 03/02/2018     03/02/2018    CO2 18 (L) 03/02/2018    BUN <2 (L) 03/02/2018    CREATININE 0.28 (L) 03/02/2018    GLUCOSE 83 03/02/2018    CALCIUM 7.9 (L) 03/02/2018    PROT 5.9 (L) 03/02/2018    LABALBU 2.4 (L) 03/02/2018    BILITOT 0.56 03/02/2018    ALKPHOS 90 03/02/2018    AST 32 (H) 03/02/2018    ALT 11

## 2018-03-02 NOTE — PROGRESS NOTES
Output              400 ml   Net             4072 ml       -----------------------------------------------------------------  Review of Systems:  Constitutional: negative for fevers, and negative for chills. Eyes: negative for visual disturbance   ENT: negative for sore throat, negative nasal congestion, and negative for earache  Respiratory: negative for shortness of breath, negative for cough, and negative for wheezing  Cardiovascular: negative for chest pain, negative for palpitations, and negative for syncope  Gastrointestinal: positive for abdominal pain, negative for nausea,negative for vomiting, negative for diarrhea, negative for constipation, and negative for hematochezia or melena  Genitourinary: negative for dysuria, negative for urinary urgency, negative for urinary frequency, and negative for hematuria  Skin: negative for skin rash, and negative for skin lesions  Neurological: negative for unilateral weakness, numbness or tingling. Exam:  GEN:  alert and oriented to person, place and time, well-developed and well-nourished, in no acute distress  EYES: No gross abnormalities.  and PERRL  NECK: normal, supple  PULM: clear to auscultation bilaterally- no wheezes, rales or rhonchi, normal air movement, no respiratory distress  COR: no murmurs and tachycardia  ABD:  Pregnant, soft, supra pubic tenderness,  normal bowel sounds, no masses or organomegaly  EXT:   edema: 1+ affecting bilateral foot, bilateral ankle  NEURO: follows commands, GIBBONS, no deficits  SKIN:  no rashes or significant lesions    -----------------------------------------------------------------  Diagnostic Data:  Lab Results   Component Value Date    WBC 4.5 03/02/2018    HGB 7.8 (L) 03/02/2018    HCT 25.6 (L) 03/02/2018     03/02/2018    ALT 11 03/02/2018    AST 32 (H) 03/02/2018     03/02/2018    K 3.0 (L) 03/02/2018     03/02/2018    CREATININE 0.28 (L) 03/02/2018    BUN <2 (L) 03/02/2018    CO2 18 (L) 03/02/2018

## 2018-03-02 NOTE — PROGRESS NOTES
Mickey Ken. Candace Lang MD  Physician Assistant Attestation Progess Note 3/2/18    I personally evaluated and examined the patient face-to-face in conjunction with the PA and agree with the management and dispostition of the patient. Please see PA's discharge summary note for full details. My key findings are:     SUBJECTIVE:    Patient admitted for Severe sepsis ([facility] [address] She      OBJECTIVE:    Vitals:   Temp: 97.8 °F (36.6 °C)  BP: 103/67  Resp: 20  Pulse: 83  SpO2: 96 %  24HR INTAKE/OUTPUT:    Intake/Output Summary (Last 24 hours) at 03/02/18 1241  Last data filed at 03/02/18 1027   Gross per 24 hour   Intake             3322 ml   Output              400 ml   Net             2922 ml     -----------------------------------------------------------------  Exam:  GEN:    Awake, alert and oriented x 3. no acute distress  EYES:  EOMI, pupils equal   NECK: Supple. No lymphadenopathy. No carotid bruit  CVS:    RRR, no murmur, rub or gallop  PULM: CTA, no wheezes, rales or rhonchi  ABD:    Bowels sounds normal.  Abdomen is soft. No distention. No tenderness. EXT:   no edema bilaterally . No calf tenderness. NEURO: Motor and sensory are intact  SKIN:  No rashes. No skin lesions. Diagnostic Data:    · All available data reviewed  Lab Results   Component Value Date    WBC 4.5 03/02/2018    HGB 7.8 (L) 03/02/2018    MCV 71.5 (L) 03/02/2018     03/02/2018    Lab Results   Component Value Date    GLUCOSE 83 03/02/2018    BUN <2 (L) 03/02/2018    CREATININE 0.28 (L) 03/02/2018     03/02/2018    K 3.0 (L) 03/02/2018    CALCIUM 7.9 (L) 03/02/2018     03/02/2018    CO2 18 (L) 03/02/2018       ASSESSMENT:      Principal Problem:    [facility]  Active Problems:    Anemia    Hydronephrosis of right kidney    Cannabis abuse    Hyponatremia    Hypokalemia  Resolved Problems:    * No resolved hospital problems.  *      PLAN:  · I agree with the plan as outlined in the PA's note  ·     Sav Kingsley ,

## 2018-03-02 NOTE — PROGRESS NOTES
Discussed with pt need for Venofer, reviewed reactions with pt, as well as gave pt a hand out with information on medication. IV infusing to left hand. Discussed with pt that blood work will need to be done when IV potassium is completed to determine effectiveness of potassium bolus. Also discussed with pt that IV's needed to be changed today, informed pt that we would be able to change the IV when drawing lab work for potassium. Pt in agreeable to IV site change. Pt denies needs or questions at this time. Call light in reach. Will continue to monitor.

## 2018-03-03 LAB
ABSOLUTE BANDS #: 0.42 K/UL (ref 0–1)
ABSOLUTE EOS #: 0 K/UL (ref 0–0.44)
ABSOLUTE IMMATURE GRANULOCYTE: 0 K/UL (ref 0–0.3)
ABSOLUTE LYMPH #: 1.43 K/UL (ref 1.1–3.7)
ABSOLUTE MONO #: 0.25 K/UL (ref 0.1–1.2)
ALBUMIN SERPL-MCNC: 2.3 G/DL (ref 3.5–5.2)
ALBUMIN/GLOBULIN RATIO: 0.7 (ref 1–2.5)
ALP BLD-CCNC: 99 U/L (ref 35–104)
ALT SERPL-CCNC: 13 U/L (ref 5–33)
ANION GAP SERPL CALCULATED.3IONS-SCNC: 10 MMOL/L (ref 9–17)
AST SERPL-CCNC: 34 U/L
ATYPICAL LYMPHOCYTE ABSOLUTE COUNT: 0.21 K/UL
ATYPICAL LYMPHOCYTES: 5 %
BANDS: 10 % (ref 0–10)
BASOPHILS # BLD: 0 % (ref 0–2)
BASOPHILS ABSOLUTE: 0 K/UL (ref 0–0.2)
BILIRUB SERPL-MCNC: 0.54 MG/DL (ref 0.3–1.2)
BUN BLDV-MCNC: <2 MG/DL (ref 6–20)
BUN/CREAT BLD: ABNORMAL (ref 9–20)
CALCIUM SERPL-MCNC: 8 MG/DL (ref 8.6–10.4)
CHLORIDE BLD-SCNC: 106 MMOL/L (ref 98–107)
CO2: 22 MMOL/L (ref 20–31)
CREAT SERPL-MCNC: 0.23 MG/DL (ref 0.5–0.9)
DIFFERENTIAL TYPE: ABNORMAL
EOSINOPHILS RELATIVE PERCENT: 0 % (ref 1–4)
GFR AFRICAN AMERICAN: >60 ML/MIN
GFR NON-AFRICAN AMERICAN: >60 ML/MIN
GFR SERPL CREATININE-BSD FRML MDRD: ABNORMAL ML/MIN/{1.73_M2}
GFR SERPL CREATININE-BSD FRML MDRD: ABNORMAL ML/MIN/{1.73_M2}
GLUCOSE BLD-MCNC: 87 MG/DL (ref 70–99)
HCT VFR BLD CALC: 26.6 % (ref 36.3–47.1)
HEMOGLOBIN: 8.1 G/DL (ref 11.9–15.1)
IMMATURE GRANULOCYTES: 0 %
LYMPHOCYTES # BLD: 34 % (ref 24–43)
MCH RBC QN AUTO: 21.4 PG (ref 25.2–33.5)
MCHC RBC AUTO-ENTMCNC: 30.5 G/DL (ref 28.4–34.8)
MCV RBC AUTO: 70.2 FL (ref 82.6–102.9)
MONOCYTES # BLD: 6 % (ref 3–12)
MORPHOLOGY: ABNORMAL
NRBC AUTOMATED: 0 PER 100 WBC
PDW BLD-RTO: 20.5 % (ref 11.8–14.4)
PLATELET # BLD: 154 K/UL (ref 138–453)
PLATELET ESTIMATE: ABNORMAL
PMV BLD AUTO: 9.2 FL (ref 8.1–13.5)
POTASSIUM SERPL-SCNC: 3.6 MMOL/L (ref 3.7–5.3)
RBC # BLD: 3.79 M/UL (ref 3.95–5.11)
RBC # BLD: ABNORMAL 10*6/UL
SEG NEUTROPHILS: 45 % (ref 36–65)
SEGMENTED NEUTROPHILS ABSOLUTE COUNT: 1.89 K/UL (ref 1.5–8.1)
SODIUM BLD-SCNC: 138 MMOL/L (ref 135–144)
TOTAL PROTEIN: 5.7 G/DL (ref 6.4–8.3)
WBC # BLD: 4.2 K/UL (ref 3.5–11.3)
WBC # BLD: ABNORMAL 10*3/UL

## 2018-03-03 PROCEDURE — 6360000002 HC RX W HCPCS: Performed by: PHYSICIAN ASSISTANT

## 2018-03-03 PROCEDURE — 2580000003 HC RX 258: Performed by: UROLOGY

## 2018-03-03 PROCEDURE — 6370000000 HC RX 637 (ALT 250 FOR IP): Performed by: FAMILY MEDICINE

## 2018-03-03 PROCEDURE — S0028 INJECTION, FAMOTIDINE, 20 MG: HCPCS | Performed by: UROLOGY

## 2018-03-03 PROCEDURE — 87493 C DIFF AMPLIFIED PROBE: CPT

## 2018-03-03 PROCEDURE — 2500000003 HC RX 250 WO HCPCS: Performed by: UROLOGY

## 2018-03-03 PROCEDURE — 6360000002 HC RX W HCPCS: Performed by: UROLOGY

## 2018-03-03 PROCEDURE — 36415 COLL VENOUS BLD VENIPUNCTURE: CPT

## 2018-03-03 PROCEDURE — 6370000000 HC RX 637 (ALT 250 FOR IP): Performed by: PHYSICIAN ASSISTANT

## 2018-03-03 PROCEDURE — 1200000000 HC SEMI PRIVATE

## 2018-03-03 PROCEDURE — 6370000000 HC RX 637 (ALT 250 FOR IP): Performed by: ADVANCED PRACTICE MIDWIFE

## 2018-03-03 PROCEDURE — 80053 COMPREHEN METABOLIC PANEL: CPT

## 2018-03-03 PROCEDURE — 85025 COMPLETE CBC W/AUTO DIFF WBC: CPT

## 2018-03-03 RX ORDER — PSEUDOEPHEDRINE HYDROCHLORIDE 30 MG/1
60 TABLET ORAL EVERY 4 HOURS PRN
Status: DISCONTINUED | OUTPATIENT
Start: 2018-03-03 | End: 2018-03-04 | Stop reason: HOSPADM

## 2018-03-03 RX ORDER — POTASSIUM CHLORIDE 20 MEQ/1
20 TABLET, EXTENDED RELEASE ORAL 2 TIMES DAILY WITH MEALS
Status: DISCONTINUED | OUTPATIENT
Start: 2018-03-03 | End: 2018-03-04 | Stop reason: HOSPADM

## 2018-03-03 RX ORDER — FLUTICASONE PROPIONATE 50 MCG
1 SPRAY, SUSPENSION (ML) NASAL 2 TIMES DAILY
Status: DISCONTINUED | OUTPATIENT
Start: 2018-03-03 | End: 2018-03-04 | Stop reason: HOSPADM

## 2018-03-03 RX ORDER — LOPERAMIDE HYDROCHLORIDE 2 MG/1
2 CAPSULE ORAL 4 TIMES DAILY PRN
Status: DISCONTINUED | OUTPATIENT
Start: 2018-03-03 | End: 2018-03-04 | Stop reason: HOSPADM

## 2018-03-03 RX ADMIN — FLUTICASONE PROPIONATE 1 SPRAY: 50 SPRAY, METERED NASAL at 20:11

## 2018-03-03 RX ADMIN — POTASSIUM CHLORIDE AND SODIUM CHLORIDE: 900; 150 INJECTION, SOLUTION INTRAVENOUS at 23:36

## 2018-03-03 RX ADMIN — DEXTROSE MONOHYDRATE 3.38 G: 50 INJECTION, SOLUTION INTRAVENOUS at 20:10

## 2018-03-03 RX ADMIN — HYDROCORTISONE: 25 CREAM TOPICAL at 10:30

## 2018-03-03 RX ADMIN — POTASSIUM CHLORIDE AND SODIUM CHLORIDE: 900; 150 INJECTION, SOLUTION INTRAVENOUS at 02:44

## 2018-03-03 RX ADMIN — HYDROCORTISONE: 1 CREAM TOPICAL at 15:12

## 2018-03-03 RX ADMIN — POTASSIUM CHLORIDE 20 MEQ: 1500 TABLET, EXTENDED RELEASE ORAL at 17:17

## 2018-03-03 RX ADMIN — POTASSIUM CHLORIDE AND SODIUM CHLORIDE: 900; 150 INJECTION, SOLUTION INTRAVENOUS at 12:43

## 2018-03-03 RX ADMIN — FAMOTIDINE 20 MG: 10 INJECTION, SOLUTION INTRAVENOUS at 20:10

## 2018-03-03 RX ADMIN — FLUTICASONE PROPIONATE 1 SPRAY: 50 SPRAY, METERED NASAL at 10:30

## 2018-03-03 RX ADMIN — Medication 500000 UNITS: at 17:17

## 2018-03-03 RX ADMIN — Medication 500000 UNITS: at 09:12

## 2018-03-03 RX ADMIN — FAMOTIDINE 20 MG: 10 INJECTION, SOLUTION INTRAVENOUS at 09:13

## 2018-03-03 RX ADMIN — VITAMIN A, VITAMIN C, VITAMIN D-3, VITAMIN E, VITAMIN B-1, VITAMIN B-2, NIACIN, VITAMIN B-6, CALCIUM, IRON, ZINC, COPPER 1 TABLET: 4000; 120; 400; 22; 1.84; 3; 20; 10; 1; 12; 200; 27; 25; 2 TABLET ORAL at 09:17

## 2018-03-03 RX ADMIN — FERROUS SULFATE TAB 325 MG (65 MG ELEMENTAL FE) 325 MG: 325 (65 FE) TAB at 09:13

## 2018-03-03 RX ADMIN — HYDROCORTISONE: 1 CREAM TOPICAL at 09:10

## 2018-03-03 RX ADMIN — HYDROCORTISONE: 25 CREAM TOPICAL at 20:19

## 2018-03-03 RX ADMIN — POTASSIUM CHLORIDE 20 MEQ: 1500 TABLET, EXTENDED RELEASE ORAL at 09:13

## 2018-03-03 RX ADMIN — DEXTROSE MONOHYDRATE 3.38 G: 50 INJECTION, SOLUTION INTRAVENOUS at 12:08

## 2018-03-03 RX ADMIN — HYDROCORTISONE: 1 CREAM TOPICAL at 20:11

## 2018-03-03 ASSESSMENT — PAIN DESCRIPTION - DESCRIPTORS
DESCRIPTORS: ACHING
DESCRIPTORS: ACHING

## 2018-03-03 ASSESSMENT — PAIN DESCRIPTION - LOCATION
LOCATION: ABDOMEN
LOCATION: ABDOMEN

## 2018-03-03 ASSESSMENT — PAIN SCALES - GENERAL
PAINLEVEL_OUTOF10: 3
PAINLEVEL_OUTOF10: 0
PAINLEVEL_OUTOF10: 0
PAINLEVEL_OUTOF10: 2
PAINLEVEL_OUTOF10: 0
PAINLEVEL_OUTOF10: 4

## 2018-03-03 ASSESSMENT — PAIN DESCRIPTION - PAIN TYPE
TYPE: ACUTE PAIN
TYPE: ACUTE PAIN

## 2018-03-03 ASSESSMENT — PAIN DESCRIPTION - ORIENTATION
ORIENTATION: RIGHT;LOWER
ORIENTATION: RIGHT;LOWER

## 2018-03-03 NOTE — PLAN OF CARE
Problem: Pain:  Goal: Pain level will decrease  Pain level will decrease   Outcome: Ongoing  Assess pain every 4 hours and prn using a 0-10 scale. Teach patient adverse complication of uncontrolled pain. Plan patients day so that aggravating activities coincide with peak of analgesic. Patients should be medicated before procedures and activities that incite pain to prevent spikes in pain. Provide patient with distractions of choice such as TV, music or reading. Discuss with patient what a tolerable pain rating would be and work towards that and not just eliminating all pain. Problem: Infection:  Goal: Will remain free from infection  Will remain free from infection   Outcome: Ongoing  Vitals are stable. Will continue to monitor. Problem: Daily Care:  Goal: Daily care needs are met  Daily care needs are met   Outcome: Ongoing  Daily cares assessed and needs met according to patient condition. Patient reminded to ask for help when needed.

## 2018-03-03 NOTE — PROGRESS NOTES
Department of Obstetrics and Gynecology  Labor and Delivery  Tyler Lezama CNM progress Note      SUBJECTIVE:  Pt here for 5th day of hospitalization, 26 yo  34 week gestation female with presumed urinary sepsis/ febrile condition, right ureteral calculi and right hydro with stent placed during hospitalization, currently afebrile over 24 hours severe anemia, stable after two units of blood. Currently denies abdominal pain, c/o diarrhea and c/o nasal congestion and cough. OBJECTIVE:  Vitals stable, afebrile x 24 hours, hgb stable at 8.1, cultures without growth. The patient is a 25 y.o. female at 21w3d. OB History      Para Term  AB Living    4 3 2     3    SAB TAB Ectopic Molar Multiple Live Births              3      Patient presents with a chief complaint diarrhea, nasal congestion. Estimated Due Date: Estimated Date of Delivery: 18    Past Medical History:   Diagnosis Date    Anemia affecting third pregnancy        Vitals:  /82   Pulse 113   Temp 97.8 °F (36.6 °C) (Temporal)   Resp 16   Ht 5' 5\" (1.651 m)   Wt 150 lb 3.2 oz (68.1 kg)   LMP 2017 (Approximate)   SpO2 96%   Breastfeeding? No   BMI 24.99 kg/m²     CONSTITUTIONAL:  awake, alert, cooperative, no apparent distress, and appears stated age  ABDOMEN:  No scars, normal bowel sounds, soft, non-distended, non-tender, no masses palpated, no hepatosplenomegally, gravid 26 fh    Membranes:  Intact    Fetal heart rate:    Baseline: wnl bpm,            Contraction frequency: denies    DATA:  Results for orders placed or performed during the hospital encounter of 18   Rapid influenza A/B antigens   Result Value Ref Range    Specimen Description . NASOPHARYNGEAL SWAB     Special Requests NOT REPORTED     Direct Exam       Presumptive negative for the presence of Influenza A and Influenza B antigen.     Direct Exam        PCR confirmation of negative results is recommended, since the antigen present Direct Exam        in the specimen may be below the detection limit of the test.    Direct Exam       Performed at [address] [facility] [address] [place] [address]    Direct Exam  ([phone]     Status FINAL 02/27/2018    Strep Screen Group A Throat   Result Value Ref Range    Specimen Description . THROAT     Special Requests NOT REPORTED     Direct Exam       Rapid Strep A negative. A negative Rapid Group A Strep Screen result does not    Direct Exam        rule out the possibility of Group A Streptococci in the specimen. A Group A    Direct Exam  strep DNA test will be performed. Direct Exam       Performed at [address] [facility] [address] [place] [address]    Direct Exam  ([phone]     Status FINAL 02/27/2018    Urine Culture   Result Value Ref Range    Specimen Description       . CLEAN CATCH URINE Performed at [facility] [address]    Specimen Description  [facility] [address] [place] [address]  [phone]     Special Requests NOT REPORTED     Culture NO SIGNIFICANT GROWTH     Culture       Performed at [facility] [place] [address] [place] [address] [phone]    Status FINAL 02/28/2018    Culture Blood #1   Result Value Ref Range    Specimen Description . BLOOD     Special Requests LT WRIST 11 MLS     Culture NO GROWTH 4 DAYS     Culture       Performed at [address]    Culture  [phone]     Status Pending    Culture Blood #1   Result Value Ref Range    Specimen Description . BLOOD     Special Requests RT HAND 20 MLS     Culture NO GROWTH 4 DAYS     Culture       Performed at [address]    Culture  [phone]     Status Pending    C Diff Toxin by RT PCR   Result Value Ref Range    Specimen Description . FECES     C Difficile tox, pcr No Toxigenic C.difficle DNA detected (NEGATIVE) CDNEG   Ova and parasite screen   Result Value Ref Range Specimen Description       . FECES Performed at [facility] [address] [facility] [place]    Specimen Description  [id number]  [phone]     Special Requests NOT REPORTED     Direct Exam Giardia Antigen Assay Negative     Direct Exam Cryptosporidium Antigen Assay Negative     Direct Exam       A Giardia/Cryptosporidium Screen has been performed. A traditional Ova and    Direct Exam        Parasite exam, if collected in a preservative, may be requested by contacting    Direct Exam        the laboratory at [phone] within 72 hrs of collection for cases of    Direct Exam        persistant diarrhea or if the patient has visited an endemic area or traveled    Direct Exam  outside of the [place]     Direct Exam       Performed at [facility] [id number] [facility], [address] [place] [address] [phone]    Status FINAL 03/02/2018    Culture Stool   Result Value Ref Range    Specimen . FECES     Campylobacter PCR  CAMNEG     NEGATIVE: No Campylobacter spp. (jejuni or coli) DNA Detected    Salmonella PCR NEGATIVE: No Salmonella spp.  DNA Detected SALNEG    Shigatoxin Gene PCR  STXNEG     NEGATIVE: No Shiga toxin-producing gene(s) Detected    Shigella Sp PCR NEGATIVE: No Shigella spp. / EIEC DNA Detected SHINEG   CBC Auto Differential   Result Value Ref Range    WBC 13.5 (H) 3.5 - 11.3 k/uL    RBC 3.47 (L) 3.95 - 5.11 m/uL    Hemoglobin 7.0 (L) 11.9 - 15.1 g/dL    Hematocrit 23.3 (L) 36.3 - 47.1 %    MCV 67.1 (L) 82.6 - 102.9 fL    MCH 20.2 (L) 25.2 - 33.5 pg    MCHC 30.0 28.4 - 34.8 g/dL    RDW 17.1 (H) 11.8 - 14.4 %    Platelets 444 951 - 717 k/uL    MPV 9.7 8.1 - 13.5 fL    NRBC Automated 0.5 (H) 0.0 per 100 WBC    Differential Type NOT REPORTED     WBC Morphology NOT REPORTED     RBC Morphology NOT REPORTED     Platelet Estimate NOT REPORTED     Seg Neutrophils 82 (H) 36 - 65 %    Lymphocytes 7 (L) 24 - 43 %    Monocytes 5 3 - 12 %    Eosinophils % 2 1 - 4 %    Immature Granulocytes 4 (H) 0 %    Basophils 0 0 - REPORTED    Mononucleosis Screen   Result Value Ref Range    Mononucleosis Screen NEGATIVE NEG   Microscopic Urinalysis   Result Value Ref Range    -          WBC, UA 2 TO 5 0 - 5 /HPF    RBC, UA 0 TO 2 0 - 2 /HPF    Casts UA NOT REPORTED /LPF    Crystals UA NOT REPORTED NONE /HPF    Epithelial Cells UA 0 TO 2 0 - 25 /HPF    Renal Epithelial, Urine NOT REPORTED 0 /HPF    Bacteria, UA 1+ (A) NONE    Mucus, UA TRACE (A) NONE    Trichomonas, UA NOT REPORTED NONE    Amorphous, UA TRACE (A) NONE    Other Observations UA NOT REPORTED NREQ    Yeast, UA NOT REPORTED NONE   Drug screen multi urine   Result Value Ref Range    Amphetamine Screen, Ur NEGATIVE NEG    Barbiturate Screen, Ur NEGATIVE NEG    Benzodiazepine Screen, Urine NEGATIVE NEG    Cocaine Metabolite, Urine NEGATIVE NEG    Methadone Screen, Urine NEGATIVE NEG    Opiates, Urine NEGATIVE NEG    Phencyclidine, Urine NEGATIVE NEG    Propoxyphene, Urine NEGATIVE NEG    Cannabinoid Scrn, Ur POSITIVE (A) NEG    Oxycodone Screen, Ur NEGATIVE NEG    Methamphetamine, Urine NEGATIVE NEG    Tricyclic Antidepressants, Ur NEGATIVE NEG    MDMA URINE NOT REPORTED NEG    Buprenorphine Urine NEGATIVE NEG    Test Information NOT REPORTED    Lactic acid, plasma   Result Value Ref Range    Lactic Acid 0.5 0.5 - 2.2 mmol/L    Lactic Acid, Whole Blood NOT REPORTED 0.7 - 2.1 mmol/L   Blood Gas, Arterial   Result Value Ref Range    pH, Arterial 7.361 7.35 - 7.45    pCO2, Arterial 23.7 (L) 35 - 45 mmHg    pO2, Arterial 106.3 (H) 80 - 100 mmHg    HCO3, Arterial 13.1 (L) 22 - 26 mmol/L    Positive Base Excess, Art NOT REPORTED 0.0 - 2.0 mmol/L    Negative Base Excess, Art 10.1 (H) 0.0 - 2.0 mmol/L    O2 Sat, Arterial 97.8 95 - 98 %    Total Hb NOT REPORTED 12.0 - 16.0 g/dl    Oxyhemoglobin NOT REPORTED 95.0 - 98.0 %    Carboxyhemoglobin NOT REPORTED 0 - 5 %    Methemoglobin NOT REPORTED 0.0 - 1.9 %    Pt Temp 37.0     pH, Art, Temp Adj NOT REPORTED 7.350 - 7.450    pCO2, Art, Temp Adj NOT REPORTED     pO2, Art, Temp Adj NOT REPORTED 80.0 - 100.0 mmHg    O2 Device/Flow/% ROOM AIR     Respiratory Rate NOT REPORTED     Dionte Test PASS     Sample Site Right Radial Artery     Pt.  Position SEMI-FOWLERS     Mode NOT REPORTED     Set Rate NOT REPORTED     Total Rate NOT REPORTED     VT NOT REPORTED     FIO2 NOT REPORTED     Peep/Cpap NOT REPORTED     PSV NOT REPORTED     Text for Respiratory NOT REPORTED     NOTIFICATION NOT REPORTED     NOTIFICATION TIME NOT REPORTED    Procalcitonin   Result Value Ref Range    Procalcitonin 0.14 (H) <0.09 ng/mL   Amylase   Result Value Ref Range    Amylase 40 28 - 100 U/L   Lipase   Result Value Ref Range    Lipase 17 13 - 60 U/L   Iron and TIBC   Result Value Ref Range    Iron 13 (L) 37 - 145 ug/dL    TIBC 455 (H) 250 - 450 ug/dL    Iron Saturation 3 (L) 20 - 55 %    UIBC 441.7 (H) 112 - 347 ug/dL   Ferritin   Result Value Ref Range    Ferritin 20 13 - 150 ug/L   Reticulocytes   Result Value Ref Range    Retic % 1.5 0.5 - 1.9 %    Absolute Retic # 0.049 0.030 - 0.080 M/uL    Immature Retic Fract 29.500 (H) 2.7 - 18.3 %    Retic Hemoglobin 23.5 (L) 28.2 - 35.7 pg   Vitamin B12 & Folate   Result Value Ref Range    Vitamin B-12 261 211 - 946 pg/mL    Folate 18.2 >4.8 ng/mL   T4, Free   Result Value Ref Range    Thyroxine, Free 1.04 0.93 - 1.70 ng/dL   TSH without Reflex   Result Value Ref Range    TSH 0.17 (L) 0.30 - 5.00 mIU/L   Lactate, Sepsis   Result Value Ref Range    Lactic Acid, Sepsis 0.5 0.5 - 1.9 mmol/L    Lactic Acid, Sepsis, Whole Blood NOT REPORTED 0.5 - 1.9 mmol/L   Comprehensive Metabolic Panel w/ Reflex to MG   Result Value Ref Range    Glucose 79 70 - 99 mg/dL    BUN 3 (L) 6 - 20 mg/dL    CREATININE 0.29 (L) 0.50 - 0.90 mg/dL    Bun/Cre Ratio 10 9 - 20    Calcium 7.4 (L) 8.6 - 10.4 mg/dL    Sodium 134 (L) 135 - 144 mmol/L    Potassium 3.6 (L) 3.7 - 5.3 mmol/L    Chloride 105 98 - 107 mmol/L    CO2 13 (L) 20 - 31 mmol/L    Anion Gap 16 9 - 17 mmol/L Lactic Acid, Whole Blood NOT REPORTED 0.7 - 2.1 mmol/L   CBC   Result Value Ref Range    WBC 9.1 3.5 - 11.3 k/uL    RBC 3.57 (L) 3.95 - 5.11 m/uL    Hemoglobin 7.7 (L) 11.9 - 15.1 g/dL    Hematocrit 25.7 (L) 36.3 - 47.1 %    MCV 72.0 (L) 82.6 - 102.9 fL    MCH 21.6 (L) 25.2 - 33.5 pg    MCHC 30.0 28.4 - 34.8 g/dL    RDW 21.2 (H) 11.8 - 14.4 %    Platelets See Reflexed IPF Result 138 - 453 k/uL    MPV NOT REPORTED 8.1 - 13.5 fL    NRBC Automated 0.2 (H) 0.0 per 100 WBC   Immature Platelet Fraction   Result Value Ref Range    Platelet, Immature Fraction 2.0 1.1 - 10.3 %    Platelet, Fluorescence 127 (L) 138 - 453 k/uL   CBC auto differential   Result Value Ref Range    WBC 6.6 3.5 - 11.3 k/uL    RBC 3.56 (L) 3.95 - 5.11 m/uL    Hemoglobin 7.8 (L) 11.9 - 15.1 g/dL    Hematocrit 25.5 (L) 36.3 - 47.1 %    MCV 71.6 (L) 82.6 - 102.9 fL    MCH 21.9 (L) 25.2 - 33.5 pg    MCHC 30.6 28.4 - 34.8 g/dL    RDW 20.0 (H) 11.8 - 14.4 %    Platelets 455 356 - 209 k/uL    MPV 9.5 8.1 - 13.5 fL    NRBC Automated 0.0 0.0 per 100 WBC    Differential Type NOT REPORTED     WBC Morphology NOT REPORTED     RBC Morphology NOT REPORTED     Platelet Estimate NOT REPORTED     Seg Neutrophils 75 (H) 36 - 65 %    Lymphocytes 15 (L) 24 - 43 %    Monocytes 6 3 - 12 %    Eosinophils % 0 (L) 1 - 4 %    Basophils 0 0 - 2 %    Immature Granulocytes 5 (H) 0 %    Segs Absolute 4.93 1.50 - 8.10 k/uL    Absolute Lymph # 0.96 (L) 1.10 - 3.70 k/uL    Absolute Mono # 0.36 0.10 - 1.20 k/uL    Absolute Eos # <0.03 0.00 - 0.44 k/uL    Basophils # <0.03 0.00 - 0.20 k/uL    Absolute Immature Granulocyte 0.33 (H) 0.00 - 0.30 k/uL   Comprehensive Metabolic Panel w/ Reflex to MG   Result Value Ref Range    Glucose 76 70 - 99 mg/dL    BUN <2 (L) 6 - 20 mg/dL    CREATININE 0.36 (L) 0.50 - 0.90 mg/dL    Bun/Cre Ratio CANNOT BE CALCULATED 9 - 20    Calcium 7.8 (L) 8.6 - 10.4 mg/dL    Sodium 135 135 - 144 mmol/L    Potassium 3.5 (L) 3.7 - 5.3 mmol/L    Chloride 103 98 - 107 move to [facility], d/c telemetry, consider d/c home tomorrow. To follow closely with OB and uro.

## 2018-03-03 NOTE — FLOWSHEET NOTE
When entered patients room, patient states she had an emesis and threw up her pills. Small amount of light brown emesis with no pills noted in waste basket. Denies nausea at present. Texting on her phone. Refused Imodium at present.

## 2018-03-03 NOTE — PROGRESS NOTES
4 DAYS     Culture       Performed at [facility] [address] [place], [address] [place] [address]    Culture  ([phone]     Status Pending    C Diff Toxin by RT PCR   Result Value Ref Range    Specimen Description . FECES     C Difficile tox, pcr No Toxigenic C.difficle DNA detected (NEGATIVE) CDNEG   Ova and parasite screen   Result Value Ref Range    Specimen Description       . FECES Performed at [facility] [address] [place], [place]    Specimen Description  [id number]  [phone]     Special Requests NOT REPORTED     Direct Exam Giardia Antigen Assay Negative     Direct Exam Cryptosporidium Antigen Assay Negative     Direct Exam       A Giardia/Cryptosporidium Screen has been performed. A traditional Ova and    Direct Exam        Parasite exam, if collected in a preservative, may be requested by contacting    Direct Exam        the laboratory at [phone] within 72 hrs of collection for cases of    Direct Exam        persistant diarrhea or if the patient has visited an endemic area or traveled    Direct Exam  outside of the [place]     Direct Exam       Performed at [facility] [address], [address] [place] [address] [phone]    Status FINAL 03/02/2018    Culture Stool   Result Value Ref Range    Specimen . FECES     Campylobacter PCR  CAMNEG     NEGATIVE: No Campylobacter spp. (jejuni or coli) DNA Detected    Salmonella PCR NEGATIVE: No Salmonella spp.  DNA Detected SALNEG    Shigatoxin Gene PCR  STXNEG     NEGATIVE: No Shiga toxin-producing gene(s) Detected    Shigella Sp PCR NEGATIVE: No Shigella spp. / EIEC DNA Detected SHINEG   CBC Auto Differential   Result Value Ref Range    WBC 13.5 (H) 3.5 - 11.3 k/uL    RBC 3.47 (L) 3.95 - 5.11 m/uL    Hemoglobin 7.0 (L) 11.9 - 15.1 g/dL    Hematocrit 23.3 (L) 36.3 - 47.1 %    MCV 67.1 (L) 82.6 - 102.9 fL    MCH 20.2 (L) 25.2 - 33.5 pg    MCHC 30.0 28.4 - 34.8 g/dL    RDW 17.1 (H) 11.8 - 14.4 %    Platelets 139 447 - 303 k/uL    MPV 9.7 0.30 k/uL   Comprehensive Metabolic Panel w/ Reflex to MG   Result Value Ref Range    Glucose 76 70 - 99 mg/dL    BUN <2 (L) 6 - 20 mg/dL    CREATININE 0.36 (L) 0.50 - 0.90 mg/dL    Bun/Cre Ratio CANNOT BE CALCULATED 9 - 20    Calcium 7.8 (L) 8.6 - 10.4 mg/dL    Sodium 135 135 - 144 mmol/L    Potassium 3.5 (L) 3.7 - 5.3 mmol/L    Chloride 103 98 - 107 mmol/L    CO2 14 (L) 20 - 31 mmol/L    Anion Gap 18 (H) 9 - 17 mmol/L    Alkaline Phosphatase 77 35 - 104 U/L    ALT 11 5 - 33 U/L    AST 33 (H) <32 U/L    Total Bilirubin 0.39 0.3 - 1.2 mg/dL    Total Protein 6.1 (L) 6.4 - 8.3 g/dL    Alb 2.6 (L) 3.5 - 5.2 g/dL    Albumin/Globulin Ratio 0.7 (L) 1.0 - 2.5    GFR Non-African American >60 >60 mL/min    GFR African American >60 >60 mL/min    GFR Comment          GFR Staging         Magnesium   Result Value Ref Range    Magnesium 1.6 1.6 - 2.6 mg/dL   Procalcitonin   Result Value Ref Range    Procalcitonin 0.20 (H) <0.09 ng/mL   Lactic acid, plasma   Result Value Ref Range    Lactic Acid 0.6 0.5 - 2.2 mmol/L    Lactic Acid, Whole Blood NOT REPORTED 0.7 - 2.1 mmol/L   CBC auto differential   Result Value Ref Range    WBC 4.5 3.5 - 11.3 k/uL    RBC 3.58 (L) 3.95 - 5.11 m/uL    Hemoglobin 7.8 (L) 11.9 - 15.1 g/dL    Hematocrit 25.6 (L) 36.3 - 47.1 %    MCV 71.5 (L) 82.6 - 102.9 fL    MCH 21.8 (L) 25.2 - 33.5 pg    MCHC 30.5 28.4 - 34.8 g/dL    RDW 20.3 (H) 11.8 - 14.4 %    Platelets 451 731 - 121 k/uL    MPV 9.5 8.1 - 13.5 fL    NRBC Automated 0.0 0.0 per 100 WBC    Differential Type NOT REPORTED     WBC Morphology NOT REPORTED     RBC Morphology NOT REPORTED     Platelet Estimate NOT REPORTED     Seg Neutrophils 68 (H) 36 - 65 %    Lymphocytes 26 24 - 43 %    Monocytes 4 3 - 12 %    Eosinophils % 0 (L) 1 - 4 %    Immature Granulocytes 2 (H) 0 %    Basophils 0 0 - 2 %    Segs Absolute 3.06 1.50 - 8.10 k/uL    Absolute Lymph # 1.17 1.10 - 3.70 k/uL    Absolute Mono # 0.18 0.10 - 1.20 k/uL    Absolute Eos # 0.00 0.00 - 0.44 k/uL    Absolute Immature Granulocyte 0.09 0.00 - 0.30 k/uL    Basophils # 0.00 0.0 - 0.2 k/uL    Morphology 1+    Comprehensive Metabolic Panel w/ Reflex to MG   Result Value Ref Range    Glucose 83 70 - 99 mg/dL    BUN <2 (L) 6 - 20 mg/dL    CREATININE 0.28 (L) 0.50 - 0.90 mg/dL    Bun/Cre Ratio CANNOT BE CALCULATED 9 - 20    Calcium 7.9 (L) 8.6 - 10.4 mg/dL    Sodium 137 135 - 144 mmol/L    Potassium 3.0 (L) 3.7 - 5.3 mmol/L    Chloride 104 98 - 107 mmol/L    CO2 18 (L) 20 - 31 mmol/L    Anion Gap 15 9 - 17 mmol/L    Alkaline Phosphatase 90 35 - 104 U/L    ALT 11 5 - 33 U/L    AST 32 (H) <32 U/L    Total Bilirubin 0.56 0.3 - 1.2 mg/dL    Total Protein 5.9 (L) 6.4 - 8.3 g/dL    Alb 2.4 (L) 3.5 - 5.2 g/dL    Albumin/Globulin Ratio 0.7 (L) 1.0 - 2.5    GFR Non-African American >60 >60 mL/min    GFR African American >60 >60 mL/min    GFR Comment          GFR Staging         Magnesium   Result Value Ref Range    Magnesium 1.6 1.6 - 2.6 mg/dL   Potassium   Result Value Ref Range    Potassium 4.0 3.7 - 5.3 mmol/L   CBC auto differential   Result Value Ref Range    WBC 4.2 3.5 - 11.3 k/uL    RBC 3.79 (L) 3.95 - 5.11 m/uL    Hemoglobin 8.1 (L) 11.9 - 15.1 g/dL    Hematocrit 26.6 (L) 36.3 - 47.1 %    MCV 70.2 (L) 82.6 - 102.9 fL    MCH 21.4 (L) 25.2 - 33.5 pg    MCHC 30.5 28.4 - 34.8 g/dL    RDW 20.5 (H) 11.8 - 14.4 %    Platelets 567 142 - 074 k/uL    MPV 9.2 8.1 - 13.5 fL    NRBC Automated 0.0 0.0 per 100 WBC    Differential Type NOT REPORTED     WBC Morphology NOT REPORTED     RBC Morphology NOT REPORTED     Platelet Estimate NOT REPORTED     Seg Neutrophils 45 36 - 65 %    Lymphocytes 34 24 - 43 %    Atypical Lymphocytes 5 %    Monocytes 6 3 - 12 %    Eosinophils % 0 (L) 1 - 4 %    Immature Granulocytes 0 0 %    Basophils 0 0 - 2 %    Bands 10 0 - 10 %    Segs Absolute 1.89 1.50 - 8.10 k/uL    Absolute Lymph # 1.43 1.10 - 3.70 k/uL    Atypical Lymphocytes Absolute 0.21 k/uL    Absolute Mono # 0.25 0.10 - 1.20 k/uL

## 2018-03-03 NOTE — PLAN OF CARE
Problem: Pain:  Goal: Control of acute pain  Control of acute pain   Outcome: Ongoing  Continuing to monitor/assess pain at least every 4 hours. Informed patient of adverse complications of uncontrolled pain. Verbalized understanding and agrees to report increases in pain level. Educated that pain medication addiction risk is greatly reduced when medication is used for acute pain on a short term basis. Plan patient's day so activities occur at the peak level of medication. Will be medicated before procedures and activities that increase pain so to prevent uncontrollable pain. Will provide distractions such as TV, music, reading, and/or visitors. Will inform physician of ineffective pain control. Will continue to monitor and medicate as ordered. Problem: Infection:  Goal: Will remain free from infection  Will remain free from infection   Outcome: Ongoing  Monitor vitals and labs for s/s of infection    Problem: Daily Care:  Goal: Daily care needs are met  Daily care needs are met   Outcome: Completed Date Met: 03/03/18      Problem: Discharge Planning:  Goal: Patients continuum of care needs are met  Patients continuum of care needs are met   Outcome: Ongoing  Patient verbalizes understanding of care plan and discharge instructions. Denies anticipated discharge needs.

## 2018-03-03 NOTE — FLOWSHEET NOTE
Sleeping in bed with her boyfriend. IV Zosyn hung as ordered. Transferred to room [facility] as ordered. Oriented to room and call light system.

## 2018-03-04 VITALS
RESPIRATION RATE: 16 BRPM | DIASTOLIC BLOOD PRESSURE: 71 MMHG | BODY MASS INDEX: 25.36 KG/M2 | WEIGHT: 152.2 LBS | HEART RATE: 85 BPM | OXYGEN SATURATION: 99 % | HEIGHT: 65 IN | TEMPERATURE: 97.5 F | SYSTOLIC BLOOD PRESSURE: 106 MMHG

## 2018-03-04 PROBLEM — A41.9 SEVERE SEPSIS (HCC): Status: RESOLVED | Noted: 2018-02-28 | Resolved: 2018-03-04

## 2018-03-04 PROBLEM — R65.20 SEVERE SEPSIS (HCC): Status: RESOLVED | Noted: 2018-02-28 | Resolved: 2018-03-04

## 2018-03-04 PROBLEM — Z34.92 ENCOUNTER FOR SUPERVISION OF NORMAL PREGNANCY IN SECOND TRIMESTER: Chronic | Status: ACTIVE | Noted: 2018-03-04

## 2018-03-04 LAB
ABO/RH: NORMAL
ABSOLUTE EOS #: 0.1 K/UL (ref 0–0.44)
ABSOLUTE IMMATURE GRANULOCYTE: 0 K/UL (ref 0–0.3)
ABSOLUTE LYMPH #: 1.63 K/UL (ref 1.1–3.7)
ABSOLUTE MONO #: 0.24 K/UL (ref 0.1–1.2)
ALBUMIN SERPL-MCNC: 2.5 G/DL (ref 3.5–5.2)
ALBUMIN/GLOBULIN RATIO: 0.7 (ref 1–2.5)
ALP BLD-CCNC: 115 U/L (ref 35–104)
ALT SERPL-CCNC: 14 U/L (ref 5–33)
ANION GAP SERPL CALCULATED.3IONS-SCNC: 10 MMOL/L (ref 9–17)
ANTIBODY SCREEN: NEGATIVE
ARM BAND NUMBER: NORMAL
AST SERPL-CCNC: 35 U/L
BASOPHILS # BLD: 0 % (ref 0–2)
BASOPHILS ABSOLUTE: 0 K/UL (ref 0–0.2)
BILIRUB SERPL-MCNC: 0.71 MG/DL (ref 0.3–1.2)
BLD PROD TYP BPU: NORMAL
BLD PROD TYP BPU: NORMAL
BUN BLDV-MCNC: 3 MG/DL (ref 6–20)
BUN/CREAT BLD: 10 (ref 9–20)
CALCIUM SERPL-MCNC: 8.3 MG/DL (ref 8.6–10.4)
CHLORIDE BLD-SCNC: 103 MMOL/L (ref 98–107)
CO2: 24 MMOL/L (ref 20–31)
CREAT SERPL-MCNC: 0.29 MG/DL (ref 0.5–0.9)
CROSSMATCH RESULT: NORMAL
CROSSMATCH RESULT: NORMAL
CULTURE: NORMAL
DIFFERENTIAL TYPE: ABNORMAL
DISPENSE STATUS BLOOD BANK: NORMAL
DISPENSE STATUS BLOOD BANK: NORMAL
EOSINOPHILS RELATIVE PERCENT: 2 % (ref 1–4)
EXPIRATION DATE: NORMAL
GFR AFRICAN AMERICAN: >60 ML/MIN
GFR NON-AFRICAN AMERICAN: >60 ML/MIN
GFR SERPL CREATININE-BSD FRML MDRD: ABNORMAL ML/MIN/{1.73_M2}
GFR SERPL CREATININE-BSD FRML MDRD: ABNORMAL ML/MIN/{1.73_M2}
GLUCOSE BLD-MCNC: 87 MG/DL (ref 70–99)
HCT VFR BLD CALC: 28.3 % (ref 36.3–47.1)
HEMOGLOBIN: 8.6 G/DL (ref 11.9–15.1)
IMMATURE GRANULOCYTES: 0 %
LYMPHOCYTES # BLD: 34 % (ref 24–43)
Lab: NORMAL
Lab: NORMAL
MCH RBC QN AUTO: 21.6 PG (ref 25.2–33.5)
MCHC RBC AUTO-ENTMCNC: 30.4 G/DL (ref 28.4–34.8)
MCV RBC AUTO: 71.1 FL (ref 82.6–102.9)
MONOCYTES # BLD: 5 % (ref 3–12)
MORPHOLOGY: ABNORMAL
NRBC AUTOMATED: 0.4 PER 100 WBC
PDW BLD-RTO: 20.6 % (ref 11.8–14.4)
PLATELET # BLD: 185 K/UL (ref 138–453)
PLATELET ESTIMATE: ABNORMAL
PMV BLD AUTO: 9.7 FL (ref 8.1–13.5)
POTASSIUM SERPL-SCNC: 3.9 MMOL/L (ref 3.7–5.3)
RBC # BLD: 3.98 M/UL (ref 3.95–5.11)
RBC # BLD: ABNORMAL 10*6/UL
SEG NEUTROPHILS: 59 % (ref 36–65)
SEGMENTED NEUTROPHILS ABSOLUTE COUNT: 2.83 K/UL (ref 1.5–8.1)
SODIUM BLD-SCNC: 137 MMOL/L (ref 135–144)
SPECIMEN DESCRIPTION: NORMAL
SPECIMEN DESCRIPTION: NORMAL
STATUS: NORMAL
STATUS: NORMAL
TOTAL PROTEIN: 6 G/DL (ref 6.4–8.3)
TRANSFUSION STATUS: NORMAL
TRANSFUSION STATUS: NORMAL
UNIT DIVISION: 0
UNIT DIVISION: 0
UNIT NUMBER: NORMAL
UNIT NUMBER: NORMAL
WBC # BLD: 4.8 K/UL (ref 3.5–11.3)
WBC # BLD: ABNORMAL 10*3/UL

## 2018-03-04 PROCEDURE — 80053 COMPREHEN METABOLIC PANEL: CPT

## 2018-03-04 PROCEDURE — 2580000003 HC RX 258: Performed by: FAMILY MEDICINE

## 2018-03-04 PROCEDURE — 6360000002 HC RX W HCPCS: Performed by: FAMILY MEDICINE

## 2018-03-04 PROCEDURE — 6370000000 HC RX 637 (ALT 250 FOR IP): Performed by: PHYSICIAN ASSISTANT

## 2018-03-04 PROCEDURE — 2500000003 HC RX 250 WO HCPCS: Performed by: UROLOGY

## 2018-03-04 PROCEDURE — 6360000002 HC RX W HCPCS: Performed by: UROLOGY

## 2018-03-04 PROCEDURE — 99217 PR OBSERVATION CARE DISCHARGE MANAGEMENT: CPT | Performed by: ADVANCED PRACTICE MIDWIFE

## 2018-03-04 PROCEDURE — 2580000003 HC RX 258: Performed by: UROLOGY

## 2018-03-04 PROCEDURE — 85025 COMPLETE CBC W/AUTO DIFF WBC: CPT

## 2018-03-04 PROCEDURE — S0028 INJECTION, FAMOTIDINE, 20 MG: HCPCS | Performed by: UROLOGY

## 2018-03-04 PROCEDURE — 36415 COLL VENOUS BLD VENIPUNCTURE: CPT

## 2018-03-04 RX ORDER — LOPERAMIDE HYDROCHLORIDE 2 MG/1
2 CAPSULE ORAL 4 TIMES DAILY PRN
Qty: 20 CAPSULE | Refills: 0 | Status: SHIPPED | OUTPATIENT
Start: 2018-03-04 | End: 2018-03-06

## 2018-03-04 RX ORDER — PRENATAL WITH FERROUS FUM AND FOLIC ACID 3080; 920; 120; 400; 22; 1.84; 3; 20; 10; 1; 12; 200; 27; 25; 2 [IU]/1; [IU]/1; MG/1; [IU]/1; MG/1; MG/1; MG/1; MG/1; MG/1; MG/1; UG/1; MG/1; MG/1; MG/1; MG/1
1 TABLET ORAL DAILY
Qty: 30 TABLET | Refills: 5 | Status: ON HOLD | OUTPATIENT
Start: 2018-03-05 | End: 2018-05-30

## 2018-03-04 RX ORDER — FERROUS SULFATE 325(65) MG
325 TABLET ORAL 2 TIMES DAILY WITH MEALS
Qty: 60 TABLET | Refills: 3 | Status: ON HOLD | OUTPATIENT
Start: 2018-03-04 | End: 2018-05-30

## 2018-03-04 RX ORDER — FLUTICASONE PROPIONATE 50 MCG
1 SPRAY, SUSPENSION (ML) NASAL 2 TIMES DAILY
Qty: 1 BOTTLE | Refills: 0 | Status: SHIPPED | OUTPATIENT
Start: 2018-03-04 | End: 2018-03-06

## 2018-03-04 RX ORDER — ACETAMINOPHEN 500 MG
1000 TABLET ORAL EVERY 6 HOURS PRN
Qty: 50 TABLET | Refills: 1 | Status: SHIPPED | OUTPATIENT
Start: 2018-03-04 | End: 2018-03-06

## 2018-03-04 RX ORDER — PSEUDOEPHEDRINE HCL 60 MG/1
60 TABLET ORAL EVERY 6 HOURS PRN
Qty: 30 TABLET | Refills: 0 | Status: SHIPPED | OUTPATIENT
Start: 2018-03-04 | End: 2018-03-06

## 2018-03-04 RX ADMIN — FERROUS SULFATE TAB 325 MG (65 MG ELEMENTAL FE) 325 MG: 325 (65 FE) TAB at 09:22

## 2018-03-04 RX ADMIN — DEXTROSE MONOHYDRATE 3.38 G: 50 INJECTION, SOLUTION INTRAVENOUS at 12:29

## 2018-03-04 RX ADMIN — VITAMIN A, VITAMIN C, VITAMIN D-3, VITAMIN E, VITAMIN B-1, VITAMIN B-2, NIACIN, VITAMIN B-6, CALCIUM, IRON, ZINC, COPPER 1 TABLET: 4000; 120; 400; 22; 1.84; 3; 20; 10; 1; 12; 200; 27; 25; 2 TABLET ORAL at 09:26

## 2018-03-04 RX ADMIN — FAMOTIDINE 20 MG: 10 INJECTION, SOLUTION INTRAVENOUS at 09:22

## 2018-03-04 RX ADMIN — DEXTROSE MONOHYDRATE 3.38 G: 50 INJECTION, SOLUTION INTRAVENOUS at 04:13

## 2018-03-04 ASSESSMENT — PAIN SCALES - GENERAL: PAINLEVEL_OUTOF10: 0

## 2018-03-04 NOTE — FLOWSHEET NOTE
IV Zosyn completed. Reviewed discharge instructions with patient. Voices understanding. To front entrance per staff for discharge home.

## 2018-03-04 NOTE — PROGRESS NOTES
OB nurses unable to come to [facility] and get fetal heart tones. Fetal doppler unable to be found. Writer uses petal doppler and only able to find patients heart tones. Patient states [address] can feel baby moving and this is ridiculous trying to use this doppler. I just want to go back to sleep\".

## 2018-03-04 NOTE — DISCHARGE SUMMARY
Obstetrics and Gynecology      Discharge Summary  DX:  Patient Active Problem List    Diagnosis Date Noted    Encounter for supervision of normal pregnancy in second trimester 03/04/2018    Hydronephrosis of right kidney 02/28/2018    Cannabis abuse 02/28/2018    Hyponatremia 02/28/2018    Hypokalemia 02/28/2018    Anemia 02/27/2018       Past Medical History:   Diagnosis Date    Anemia affecting third pregnancy        Condition: stable    Fetal status: reassuring    Diet : regular as tolerated    Activities: up ad dimple      No current facility-administered medications on file prior to encounter. No current outpatient prescriptions on file prior to encounter. Discharge to Home in good condition    Call [facility] for prenatal new appointment on Monday. Return to labor and delivery with any bleeding, leakage of fluid, decreased fetal movement, fever, regular contractions greater than 4-6 per hour,dysuria,headaches, vision changes, or increased pain. Call with fever, headache or painful urination.

## 2018-03-04 NOTE — PROGRESS NOTES
Progress Note    SUBJECTIVE: Patient is seen for Principal Problem:    Severe sepsis ([address]  Active Problems:    Anemia    Hydronephrosis of right kidney    Cannabis abuse    Hyponatremia    Hypokalemia  Resolved Problems:    * No resolved hospital problems. *     feeling better  Does not want oral potassium  Repeat k 3.9  All cultures neg    OBJECTIVE:    Vitals:   Vitals:    03/03/18 2338   BP: 114/66   Pulse: 87   Resp: 16   Temp: 97.9 °F (36.6 °C)   SpO2: 98%     Weight: 152 lb 3.2 oz (69 kg)   Height: 5' 5\" (165.1 cm)   -----------------------------------------------------------------  Exam:    CONSTITUTIONAL:  awake, alert, cooperative, no apparent distress, and appears stated age  EYES:  Lids and lashes normal, pupils equal, round and reactive to light, extra ocular muscles intact, sclera clear, conjunctiva normal  ENT:  Normocephalic, without obvious abnormality, atraumatic, sinuses nontender on palpation, external ears without lesions, oral pharynx with moist mucus membranes, tonsils without erythema or exudates, gums normal and good dentition.   NECK:  Supple, symmetrical, trachea midline, no adenopathy, thyroid symmetric, not enlarged and no tenderness, skin normal  HEMATOLOGIC/LYMPHATICS:  no cervical lymphadenopathy  LUNGS:  No increased work of breathing, good air exchange, clear to auscultation bilaterally, no crackles or wheezing  CARDIOVASCULAR:  Normal apical impulse, regular rate and rhythm, normal S1 and S2, no S3 or S4, and no murmur noted  ABDOMEN:  Soft,non tender    MUSCULOSKELETAL:  there is no redness, warmth, or swelling of the joints  NEUROLOGIC:  No motor or sensory deficit  SKIN:  no bruising or bleeding  EXT:     no cyanosis, clubbing or edema present    -----------------------------------------------------------------  Diagnostic Data: Reviewed    More Recent Labs:    Results for orders placed or performed during the hospital encounter of 02/27/18   Rapid influenza A/B antigens   Result [facility] [address] [facility] [place], [address] [place] [address]    Culture  [phone]     Status Pending    C Diff Toxin by RT PCR   Result Value Ref Range    Specimen Description . FECES     C Difficile tox, pcr No Toxigenic C.difficle DNA detected (NEGATIVE) CDNEG   Ova and parasite screen   Result Value Ref Range    Specimen Description       . FECES Performed at [facility] [place], [place]    Specimen Description  [id number]  [phone]     Special Requests NOT REPORTED     Direct Exam Giardia Antigen Assay Negative     Direct Exam Cryptosporidium Antigen Assay Negative     Direct Exam       A Giardia/Cryptosporidium Screen has been performed. A traditional Ova and    Direct Exam        Parasite exam, if collected in a preservative, may be requested by contacting    Direct Exam        the laboratory at [phone] within 72 hrs of collection for cases of    Direct Exam        persistant diarrhea or if the patient has visited an endemic area or traveled    Direct Exam  outside of the [place]     Direct Exam       Performed at [facility] [address], [address] [place] [address] [phone]    Status FINAL 03/02/2018    Culture Stool   Result Value Ref Range    Specimen . FECES     Campylobacter PCR  CAMNEG     NEGATIVE: No Campylobacter spp. (jejuni or coli) DNA Detected    Salmonella PCR NEGATIVE: No Salmonella spp.  DNA Detected SALNEG    Shigatoxin Gene PCR  STXNEG     NEGATIVE: No Shiga toxin-producing gene(s) Detected    Shigella Sp PCR NEGATIVE: No Shigella spp. / EIEC DNA Detected SHINEG   CBC Auto Differential   Result Value Ref Range    WBC 13.5 (H) 3.5 - 11.3 k/uL    RBC 3.47 (L) 3.95 - 5.11 m/uL    Hemoglobin 7.0 (L) 11.9 - 15.1 g/dL    Hematocrit 23.3 (L) 36.3 - 47.1 %    MCV 67.1 (L) 82.6 - 102.9 fL    MCH 20.2 (L) 25.2 - 33.5 pg    MCHC 30.0 28.4 - 34.8 g/dL    RDW 17.1 (H) 11.8 - 14.4 %    Platelets 750 974 - 797 k/uL    MPV 9.7 8.1 - 13.5 fL    NRBC Automated 0.5 (H) 0.0 per 100 WBC Urine NEGATIVE NEG    Ketones, Urine 3+ (A) NEG    Specific Gravity, UA 1.015 1.010 - 1.020    Urine Hgb NEGATIVE NEG    pH, UA 6.0 5.0 - 9.0    Protein, UA NEGATIVE NEG    Urobilinogen, Urine Normal NORM    Nitrite, Urine NEGATIVE NEG    Leukocyte Esterase, Urine SMALL (A) NEG    Urinalysis Comments NOT REPORTED    Mononucleosis Screen   Result Value Ref Range    Mononucleosis Screen NEGATIVE NEG   Microscopic Urinalysis   Result Value Ref Range    -          WBC, UA 2 TO 5 0 - 5 /HPF    RBC, UA 0 TO 2 0 - 2 /HPF    Casts UA NOT REPORTED /LPF    Crystals UA NOT REPORTED NONE /HPF    Epithelial Cells UA 0 TO 2 0 - 25 /HPF    Renal Epithelial, Urine NOT REPORTED 0 /HPF    Bacteria, UA 1+ (A) NONE    Mucus, UA TRACE (A) NONE    Trichomonas, UA NOT REPORTED NONE    Amorphous, UA TRACE (A) NONE    Other Observations UA NOT REPORTED NREQ    Yeast, UA NOT REPORTED NONE   Drug screen multi urine   Result Value Ref Range    Amphetamine Screen, Ur NEGATIVE NEG    Barbiturate Screen, Ur NEGATIVE NEG    Benzodiazepine Screen, Urine NEGATIVE NEG    Cocaine Metabolite, Urine NEGATIVE NEG    Methadone Screen, Urine NEGATIVE NEG    Opiates, Urine NEGATIVE NEG    Phencyclidine, Urine NEGATIVE NEG    Propoxyphene, Urine NEGATIVE NEG    Cannabinoid Scrn, Ur POSITIVE (A) NEG    Oxycodone Screen, Ur NEGATIVE NEG    Methamphetamine, Urine NEGATIVE NEG    Tricyclic Antidepressants, Ur NEGATIVE NEG    MDMA URINE NOT REPORTED NEG    Buprenorphine Urine NEGATIVE NEG    Test Information NOT REPORTED    Lactic acid, plasma   Result Value Ref Range    Lactic Acid 0.5 0.5 - 2.2 mmol/L    Lactic Acid, Whole Blood NOT REPORTED 0.7 - 2.1 mmol/L   Blood Gas, Arterial   Result Value Ref Range    pH, Arterial 7.361 7.35 - 7.45    pCO2, Arterial 23.7 (L) 35 - 45 mmHg    pO2, Arterial 106.3 (H) 80 - 100 mmHg    HCO3, Arterial 13.1 (L) 22 - 26 mmol/L    Positive Base Excess, Art NOT REPORTED 0.0 - 2.0 mmol/L    Negative Base Excess, Art 10.1 (H) 0.0 - mg/dL    BUN 3 (L) 6 - 20 mg/dL    CREATININE 0.29 (L) 0.50 - 0.90 mg/dL    Bun/Cre Ratio 10 9 - 20    Calcium 7.4 (L) 8.6 - 10.4 mg/dL    Sodium 134 (L) 135 - 144 mmol/L    Potassium 3.6 (L) 3.7 - 5.3 mmol/L    Chloride 105 98 - 107 mmol/L    CO2 13 (L) 20 - 31 mmol/L    Anion Gap 16 9 - 17 mmol/L    Alkaline Phosphatase 68 35 - 104 U/L    ALT 10 5 - 33 U/L    AST 18 <32 U/L    Total Bilirubin 0.25 (L) 0.3 - 1.2 mg/dL    Total Protein 5.8 (L) 6.4 - 8.3 g/dL    Alb 2.5 (L) 3.5 - 5.2 g/dL    Albumin/Globulin Ratio 0.8 (L) 1.0 - 2.5    GFR Non-African American >60 >60 mL/min    GFR African American >60 >60 mL/min    GFR Comment          GFR Staging         PRENATAL PROFILE I   Result Value Ref Range    WBC 8.8 3.5 - 11.3 k/uL    RBC 3.04 (L) 3.95 - 5.11 m/uL    Hemoglobin 6.4 (LL) 11.9 - 15.1 g/dL    Hematocrit 21.5 (L) 36.3 - 47.1 %    MCV 70.7 (L) 82.6 - 102.9 fL    MCH 21.1 (L) 25.2 - 33.5 pg    MCHC 29.8 28.4 - 34.8 g/dL    RDW 18.7 (H) 11.8 - 14.4 %    Platelets 014 438 - 899 k/uL    MPV 9.8 8.1 - 13.5 fL    NRBC Automated 0.3 (H) 0.0 per 100 WBC    Rubella Antibody, IGG 3.0 IU/mL    Hepatitis B Surface Ag NONREACTIVE NR    Differential Type NOT REPORTED     WBC Morphology NOT REPORTED     RBC Morphology NOT REPORTED     Platelet Estimate NOT REPORTED     Seg Neutrophils 90 (H) 36 - 65 %    Lymphocytes 6 (L) 24 - 43 %    Monocytes 3 3 - 12 %    Eosinophils % 0 (L) 1 - 4 %    Immature Granulocytes 1 (H) 0 %    Basophils 0 0 - 2 %    nRBC 1 0 - 5 per 100 WBC    Segs Absolute 7.93 1.50 - 8.10 k/uL    Absolute Lymph # 0.53 (L) 1.10 - 3.70 k/uL    Absolute Mono # 0.26 0.10 - 1.20 k/uL    Absolute Eos # 0.00 0.00 - 0.44 k/uL    Absolute Immature Granulocyte 0.09 0.00 - 0.30 k/uL    Basophils # 0.00 0.0 - 0.2 k/uL    Morphology 1+     T. pallidum, IgG NONREACTIVE NR   Hepatitis C antibody   Result Value Ref Range    Hepatitis C Ab NONREACTIVE NR   HIV Screen   Result Value Ref Range    HIV Ag/Ab NONREACTIVE NR Protime-INR   Result Value Ref Range    Protime 10.7 9.7 - 12.2 sec    INR 1.0 0.9 - 1.2   APTT   Result Value Ref Range    PTT 33.1 23.2 - 34.4 sec   Fibrinogen   Result Value Ref Range    Fibrinogen 362 185 - 451 mg/dL   Lactic acid, plasma   Result Value Ref Range    Lactic Acid 0.5 0.5 - 2.2 mmol/L    Lactic Acid, Whole Blood NOT REPORTED 0.7 - 2.1 mmol/L   CBC   Result Value Ref Range    WBC 9.1 3.5 - 11.3 k/uL    RBC 3.57 (L) 3.95 - 5.11 m/uL    Hemoglobin 7.7 (L) 11.9 - 15.1 g/dL    Hematocrit 25.7 (L) 36.3 - 47.1 %    MCV 72.0 (L) 82.6 - 102.9 fL    MCH 21.6 (L) 25.2 - 33.5 pg    MCHC 30.0 28.4 - 34.8 g/dL    RDW 21.2 (H) 11.8 - 14.4 %    Platelets See Reflexed IPF Result 138 - 453 k/uL    MPV NOT REPORTED 8.1 - 13.5 fL    NRBC Automated 0.2 (H) 0.0 per 100 WBC   Immature Platelet Fraction   Result Value Ref Range    Platelet, Immature Fraction 2.0 1.1 - 10.3 %    Platelet, Fluorescence 127 (L) 138 - 453 k/uL   CBC auto differential   Result Value Ref Range    WBC 6.6 3.5 - 11.3 k/uL    RBC 3.56 (L) 3.95 - 5.11 m/uL    Hemoglobin 7.8 (L) 11.9 - 15.1 g/dL    Hematocrit 25.5 (L) 36.3 - 47.1 %    MCV 71.6 (L) 82.6 - 102.9 fL    MCH 21.9 (L) 25.2 - 33.5 pg    MCHC 30.6 28.4 - 34.8 g/dL    RDW 20.0 (H) 11.8 - 14.4 %    Platelets 473 729 - 150 k/uL    MPV 9.5 8.1 - 13.5 fL    NRBC Automated 0.0 0.0 per 100 WBC    Differential Type NOT REPORTED     WBC Morphology NOT REPORTED     RBC Morphology NOT REPORTED     Platelet Estimate NOT REPORTED     Seg Neutrophils 75 (H) 36 - 65 %    Lymphocytes 15 (L) 24 - 43 %    Monocytes 6 3 - 12 %    Eosinophils % 0 (L) 1 - 4 %    Basophils 0 0 - 2 %    Immature Granulocytes 5 (H) 0 %    Segs Absolute 4.93 1.50 - 8.10 k/uL    Absolute Lymph # 0.96 (L) 1.10 - 3.70 k/uL    Absolute Mono # 0.36 0.10 - 1.20 k/uL    Absolute Eos # <0.03 0.00 - 0.44 k/uL    Basophils # <0.03 0.00 - 0.20 k/uL    Absolute Immature Granulocyte 0.33 (H) 0.00 - 0.30 k/uL   Comprehensive Metabolic Panel w/ Reflex to MG   Result Value Ref Range    Glucose 76 70 - 99 mg/dL    BUN <2 (L) 6 - 20 mg/dL    CREATININE 0.36 (L) 0.50 - 0.90 mg/dL    Bun/Cre Ratio CANNOT BE CALCULATED 9 - 20    Calcium 7.8 (L) 8.6 - 10.4 mg/dL    Sodium 135 135 - 144 mmol/L    Potassium 3.5 (L) 3.7 - 5.3 mmol/L    Chloride 103 98 - 107 mmol/L    CO2 14 (L) 20 - 31 mmol/L    Anion Gap 18 (H) 9 - 17 mmol/L    Alkaline Phosphatase 77 35 - 104 U/L    ALT 11 5 - 33 U/L    AST 33 (H) <32 U/L    Total Bilirubin 0.39 0.3 - 1.2 mg/dL    Total Protein 6.1 (L) 6.4 - 8.3 g/dL    Alb 2.6 (L) 3.5 - 5.2 g/dL    Albumin/Globulin Ratio 0.7 (L) 1.0 - 2.5    GFR Non-African American >60 >60 mL/min    GFR African American >60 >60 mL/min    GFR Comment          GFR Staging         Magnesium   Result Value Ref Range    Magnesium 1.6 1.6 - 2.6 mg/dL   Procalcitonin   Result Value Ref Range    Procalcitonin 0.20 (H) <0.09 ng/mL   Lactic acid, plasma   Result Value Ref Range    Lactic Acid 0.6 0.5 - 2.2 mmol/L    Lactic Acid, Whole Blood NOT REPORTED 0.7 - 2.1 mmol/L   CBC auto differential   Result Value Ref Range    WBC 4.5 3.5 - 11.3 k/uL    RBC 3.58 (L) 3.95 - 5.11 m/uL    Hemoglobin 7.8 (L) 11.9 - 15.1 g/dL    Hematocrit 25.6 (L) 36.3 - 47.1 %    MCV 71.5 (L) 82.6 - 102.9 fL    MCH 21.8 (L) 25.2 - 33.5 pg    MCHC 30.5 28.4 - 34.8 g/dL    RDW 20.3 (H) 11.8 - 14.4 %    Platelets 261 198 - 868 k/uL    MPV 9.5 8.1 - 13.5 fL    NRBC Automated 0.0 0.0 per 100 WBC    Differential Type NOT REPORTED     WBC Morphology NOT REPORTED     RBC Morphology NOT REPORTED     Platelet Estimate NOT REPORTED     Seg Neutrophils 68 (H) 36 - 65 %    Lymphocytes 26 24 - 43 %    Monocytes 4 3 - 12 %    Eosinophils % 0 (L) 1 - 4 %    Immature Granulocytes 2 (H) 0 %    Basophils 0 0 - 2 %    Segs Absolute 3.06 1.50 - 8.10 k/uL    Absolute Lymph # 1.17 1.10 - 3.70 k/uL    Absolute Mono # 0.18 0.10 - 1.20 k/uL    Absolute Eos # 0.00 0.00 - 0.44 k/uL    Absolute Immature Granulocyte 0.09 0.00 mg/dL    CREATININE 0.29 (L) 0.50 - 0.90 mg/dL    Bun/Cre Ratio 10 9 - 20    Calcium 8.3 (L) 8.6 - 10.4 mg/dL    Sodium 137 135 - 144 mmol/L    Potassium 3.9 3.7 - 5.3 mmol/L    Chloride 103 98 - 107 mmol/L    CO2 24 20 - 31 mmol/L    Anion Gap 10 9 - 17 mmol/L    Alkaline Phosphatase 115 (H) 35 - 104 U/L    ALT 14 5 - 33 U/L    AST 35 (H) <32 U/L    Total Bilirubin 0.71 0.3 - 1.2 mg/dL    Total Protein 6.0 (L) 6.4 - 8.3 g/dL    Alb 2.5 (L) 3.5 - 5.2 g/dL    Albumin/Globulin Ratio 0.7 (L) 1.0 - 2.5    GFR Non-African American >60 >60 mL/min    GFR African American >60 >60 mL/min    GFR Comment          GFR Staging         EKG 12 lead   Result Value Ref Range    Ventricular Rate 115 BPM    Atrial Rate 115 BPM    P-R Interval 150 ms    QRS Duration 78 ms    Q-T Interval 316 ms    QTc Calculation (Bazett) 437 ms    P Axis 60 degrees    R Axis 57 degrees    T Axis 17 degrees   TYPE AND SCREEN   Result Value Ref Range    Expiration Date 03/02/2018     Arm Band Number [id number]     ABO/Rh A NEGATIVE     Antibody Screen NEGATIVE     Unit Number [id number]     Product Code Leukocyte Reduced Red Cell     Unit Divison 0     Dispense Status TRANSFUSED     Transfusion Status OK TO TRANSFUSE     Crossmatch Result COMPATIBLE     Unit Number [id number]     Product Code Leukocyte Reduced Red Cell     Unit Divison 0     Dispense Status TRANSFUSED     Transfusion Status OK TO TRANSFUSE     Crossmatch Result COMPATIBLE        ASSESSMENT:   Patient Active Problem List    Diagnosis Date Noted    Severe sepsis ([facility] [address] 02/28/2018    Hydronephrosis of right kidney 02/28/2018    Cannabis abuse 02/28/2018    Hyponatremia 02/28/2018    Hypokalemia 02/28/2018    Anemia 02/27/2018         PLAN:  · Change to rocephin 1 gm daily for 4 more days starting tomorrow,can complete therapy as out pt      Shanthi Julio M.D.

## 2018-03-04 NOTE — PROGRESS NOTES
confirmation of negative results is recommended, since the antigen present    Direct Exam        in the specimen may be below the detection limit of the test.    Direct Exam       Performed at [address]. [facility]    Direct Exam  ([phone]     Status FINAL 02/27/2018    Strep Screen Group A Throat   Result Value Ref Range    Specimen Description . THROAT     Special Requests NOT REPORTED     Direct Exam       Rapid Strep A negative. A negative Rapid Group A Strep Screen result does not    Direct Exam        rule out the possibility of Group A Streptococci in the specimen. A Group A    Direct Exam  strep DNA test will be performed. Direct Exam       Performed at [address] [facility]    Direct Exam  ([phone]     Status FINAL 02/27/2018    Urine Culture   Result Value Ref Range    Specimen Description       . CLEAN CATCH URINE Performed at [facility]    Specimen Description  Theresa [address] [place] [address]  [phone]     Special Requests NOT REPORTED     Culture NO SIGNIFICANT GROWTH     Culture       Performed at [address], [address] [place] [address] [phone]    Status FINAL 02/28/2018    Culture Blood #1   Result Value Ref Range    Specimen Description . BLOOD     Special Requests LT WRIST 11 MLS     Culture NO GROWTH 5 DAYS     Culture       Performed at [address] [facility] [address] [place] [address]    Culture  [phone]     Status FINAL 03/04/2018    Culture Blood #1   Result Value Ref Range    Specimen Description . BLOOD     Special Requests RT HAND 20 MLS     Culture NO GROWTH 5 DAYS     Culture       Performed at [address] [facility] [address] [place] [address]    Culture  [phone]     Status FINAL 03/04/2018    C Diff Toxin by RT PCR   Result Value Ref Range    Specimen Description . FECES     C Difficile tox, pcr No Toxigenic C.difficle DNA detected (NEGATIVE) CDNEG   Ova and parasite screen   Result Value Ref Range    Specimen Description       . FECES Performed at [facility] [address] [place]    Specimen Description  [id number]  [phone]     Special Requests NOT REPORTED     Direct Exam Giardia Antigen Assay Negative     Direct Exam Cryptosporidium Antigen Assay Negative     Direct Exam       A Giardia/Cryptosporidium Screen has been performed. A traditional Ova and    Direct Exam        Parasite exam, if collected in a preservative, may be requested by contacting    Direct Exam        the laboratory at [phone] within 72 hrs of collection for cases of    Direct Exam        persistant diarrhea or if the patient has visited an endemic area or traveled    Direct Exam  outside of the [place]     Direct Exam       Performed at [facility] [address] [place] [address], [address] [place] [address] [phone]    Status FINAL 03/02/2018    Culture Stool   Result Value Ref Range    Specimen . FECES     Campylobacter PCR  CAMNEG     NEGATIVE: No Campylobacter spp. (jejuni or coli) DNA Detected    Salmonella PCR NEGATIVE: No Salmonella spp.  DNA Detected SALNEG    Shigatoxin Gene PCR  STXNEG     NEGATIVE: No Shiga toxin-producing gene(s) Detected    Shigella Sp PCR NEGATIVE: No Shigella spp. / EIEC DNA Detected SHINEG   CBC Auto Differential   Result Value Ref Range    WBC 13.5 (H) 3.5 - 11.3 k/uL    RBC 3.47 (L) 3.95 - 5.11 m/uL    Hemoglobin 7.0 (L) 11.9 - 15.1 g/dL    Hematocrit 23.3 (L) 36.3 - 47.1 %    MCV 67.1 (L) 82.6 - 102.9 fL    MCH 20.2 (L) 25.2 - 33.5 pg    MCHC 30.0 28.4 - 34.8 g/dL    RDW 17.1 (H) 11.8 - 14.4 %    Platelets 035 185 - 708 k/uL    MPV 9.7 8.1 - 13.5 fL    NRBC Automated 0.5 (H) 0.0 per 100 WBC    Differential Type NOT REPORTED     WBC Morphology NOT REPORTED     RBC Morphology NOT REPORTED     Platelet Estimate NOT REPORTED     Seg Neutrophils 82 (H) 36 - 65 %    Lymphocytes 7 (L) 24 - 43 % 0.0 - 1.9 %    Pt Temp 37.0     pH, Art, Temp Adj NOT REPORTED 7.350 - 7.450    pCO2, Art, Temp Adj NOT REPORTED     pO2, Art, Temp Adj NOT REPORTED 80.0 - 100.0 mmHg    O2 Device/Flow/% ROOM AIR     Respiratory Rate NOT REPORTED     Dionte Test PASS     Sample Site Right Radial Artery     Pt.  Position SEMI-FOWLERS     Mode NOT REPORTED     Set Rate NOT REPORTED     Total Rate NOT REPORTED     VT NOT REPORTED     FIO2 NOT REPORTED     Peep/Cpap NOT REPORTED     PSV NOT REPORTED     Text for Respiratory NOT REPORTED     NOTIFICATION NOT REPORTED     NOTIFICATION TIME NOT REPORTED    Procalcitonin   Result Value Ref Range    Procalcitonin 0.14 (H) <0.09 ng/mL   Amylase   Result Value Ref Range    Amylase 40 28 - 100 U/L   Lipase   Result Value Ref Range    Lipase 17 13 - 60 U/L   Iron and TIBC   Result Value Ref Range    Iron 13 (L) 37 - 145 ug/dL    TIBC 455 (H) 250 - 450 ug/dL    Iron Saturation 3 (L) 20 - 55 %    UIBC 441.7 (H) 112 - 347 ug/dL   Ferritin   Result Value Ref Range    Ferritin 20 13 - 150 ug/L   Reticulocytes   Result Value Ref Range    Retic % 1.5 0.5 - 1.9 %    Absolute Retic # 0.049 0.030 - 0.080 M/uL    Immature Retic Fract 29.500 (H) 2.7 - 18.3 %    Retic Hemoglobin 23.5 (L) 28.2 - 35.7 pg   Vitamin B12 & Folate   Result Value Ref Range    Vitamin B-12 261 211 - 946 pg/mL    Folate 18.2 >4.8 ng/mL   T4, Free   Result Value Ref Range    Thyroxine, Free 1.04 0.93 - 1.70 ng/dL   TSH without Reflex   Result Value Ref Range    TSH 0.17 (L) 0.30 - 5.00 mIU/L   Lactate, Sepsis   Result Value Ref Range    Lactic Acid, Sepsis 0.5 0.5 - 1.9 mmol/L    Lactic Acid, Sepsis, Whole Blood NOT REPORTED 0.5 - 1.9 mmol/L   Comprehensive Metabolic Panel w/ Reflex to MG   Result Value Ref Range    Glucose 79 70 - 99 mg/dL    BUN 3 (L) 6 - 20 mg/dL    CREATININE 0.29 (L) 0.50 - 0.90 mg/dL    Bun/Cre Ratio 10 9 - 20    Calcium 7.4 (L) 8.6 - 10.4 mg/dL    Sodium 134 (L) 135 - 144 mmol/L    Potassium 3.6 (L) 3.7 - 5.3 mg/dL    Sodium 135 135 - 144 mmol/L    Potassium 3.5 (L) 3.7 - 5.3 mmol/L    Chloride 103 98 - 107 mmol/L    CO2 14 (L) 20 - 31 mmol/L    Anion Gap 18 (H) 9 - 17 mmol/L    Alkaline Phosphatase 77 35 - 104 U/L    ALT 11 5 - 33 U/L    AST 33 (H) <32 U/L    Total Bilirubin 0.39 0.3 - 1.2 mg/dL    Total Protein 6.1 (L) 6.4 - 8.3 g/dL    Alb 2.6 (L) 3.5 - 5.2 g/dL    Albumin/Globulin Ratio 0.7 (L) 1.0 - 2.5    GFR Non-African American >60 >60 mL/min    GFR African American >60 >60 mL/min    GFR Comment          GFR Staging         Magnesium   Result Value Ref Range    Magnesium 1.6 1.6 - 2.6 mg/dL   Procalcitonin   Result Value Ref Range    Procalcitonin 0.20 (H) <0.09 ng/mL   Lactic acid, plasma   Result Value Ref Range    Lactic Acid 0.6 0.5 - 2.2 mmol/L    Lactic Acid, Whole Blood NOT REPORTED 0.7 - 2.1 mmol/L   CBC auto differential   Result Value Ref Range    WBC 4.5 3.5 - 11.3 k/uL    RBC 3.58 (L) 3.95 - 5.11 m/uL    Hemoglobin 7.8 (L) 11.9 - 15.1 g/dL    Hematocrit 25.6 (L) 36.3 - 47.1 %    MCV 71.5 (L) 82.6 - 102.9 fL    MCH 21.8 (L) 25.2 - 33.5 pg    MCHC 30.5 28.4 - 34.8 g/dL    RDW 20.3 (H) 11.8 - 14.4 %    Platelets 624 275 - 026 k/uL    MPV 9.5 8.1 - 13.5 fL    NRBC Automated 0.0 0.0 per 100 WBC    Differential Type NOT REPORTED     WBC Morphology NOT REPORTED     RBC Morphology NOT REPORTED     Platelet Estimate NOT REPORTED     Seg Neutrophils 68 (H) 36 - 65 %    Lymphocytes 26 24 - 43 %    Monocytes 4 3 - 12 %    Eosinophils % 0 (L) 1 - 4 %    Immature Granulocytes 2 (H) 0 %    Basophils 0 0 - 2 %    Segs Absolute 3.06 1.50 - 8.10 k/uL    Absolute Lymph # 1.17 1.10 - 3.70 k/uL    Absolute Mono # 0.18 0.10 - 1.20 k/uL    Absolute Eos # 0.00 0.00 - 0.44 k/uL    Absolute Immature Granulocyte 0.09 0.00 - 0.30 k/uL    Basophils # 0.00 0.0 - 0.2 k/uL    Morphology 1+    Comprehensive Metabolic Panel w/ Reflex to MG   Result Value Ref Range    Glucose 83 70 - 99 mg/dL    BUN <2 (L) 6 - 20 mg/dL    CREATININE 0.28 (L) 0.50 - 0.90 mg/dL    Bun/Cre Ratio CANNOT BE CALCULATED 9 - 20    Calcium 7.9 (L) 8.6 - 10.4 mg/dL    Sodium 137 135 - 144 mmol/L    Potassium 3.0 (L) 3.7 - 5.3 mmol/L    Chloride 104 98 - 107 mmol/L    CO2 18 (L) 20 - 31 mmol/L    Anion Gap 15 9 - 17 mmol/L    Alkaline Phosphatase 90 35 - 104 U/L    ALT 11 5 - 33 U/L    AST 32 (H) <32 U/L    Total Bilirubin 0.56 0.3 - 1.2 mg/dL    Total Protein 5.9 (L) 6.4 - 8.3 g/dL    Alb 2.4 (L) 3.5 - 5.2 g/dL    Albumin/Globulin Ratio 0.7 (L) 1.0 - 2.5    GFR Non-African American >60 >60 mL/min    GFR African American >60 >60 mL/min    GFR Comment          GFR Staging         Magnesium   Result Value Ref Range    Magnesium 1.6 1.6 - 2.6 mg/dL   Potassium   Result Value Ref Range    Potassium 4.0 3.7 - 5.3 mmol/L   CBC auto differential   Result Value Ref Range    WBC 4.2 3.5 - 11.3 k/uL    RBC 3.79 (L) 3.95 - 5.11 m/uL    Hemoglobin 8.1 (L) 11.9 - 15.1 g/dL    Hematocrit 26.6 (L) 36.3 - 47.1 %    MCV 70.2 (L) 82.6 - 102.9 fL    MCH 21.4 (L) 25.2 - 33.5 pg    MCHC 30.5 28.4 - 34.8 g/dL    RDW 20.5 (H) 11.8 - 14.4 %    Platelets 286 226 - 177 k/uL    MPV 9.2 8.1 - 13.5 fL    NRBC Automated 0.0 0.0 per 100 WBC    Differential Type NOT REPORTED     WBC Morphology NOT REPORTED     RBC Morphology NOT REPORTED     Platelet Estimate NOT REPORTED     Seg Neutrophils 45 36 - 65 %    Lymphocytes 34 24 - 43 %    Atypical Lymphocytes 5 %    Monocytes 6 3 - 12 %    Eosinophils % 0 (L) 1 - 4 %    Immature Granulocytes 0 0 %    Basophils 0 0 - 2 %    Bands 10 0 - 10 %    Segs Absolute 1.89 1.50 - 8.10 k/uL    Absolute Lymph # 1.43 1.10 - 3.70 k/uL    Atypical Lymphocytes Absolute 0.21 k/uL    Absolute Mono # 0.25 0.10 - 1.20 k/uL    Absolute Eos # 0.00 0.00 - 0.44 k/uL    Absolute Immature Granulocyte 0.00 0.00 - 0.30 k/uL    Basophils # 0.00 0.0 - 0.2 k/uL    Absolute Bands # 0.42 0.0 - 1.0 k/uL    Morphology 1+    Comprehensive Metabolic Panel w/ Reflex to MG   Result Value Ref Range    Glucose 87 70 - 99 mg/dL    BUN <2 (L) 6 - 20 mg/dL    CREATININE 0.23 (L) 0.50 - 0.90 mg/dL    Bun/Cre Ratio CANNOT BE CALCULATED 9 - 20    Calcium 8.0 (L) 8.6 - 10.4 mg/dL    Sodium 138 135 - 144 mmol/L    Potassium 3.6 (L) 3.7 - 5.3 mmol/L    Chloride 106 98 - 107 mmol/L    CO2 22 20 - 31 mmol/L    Anion Gap 10 9 - 17 mmol/L    Alkaline Phosphatase 99 35 - 104 U/L    ALT 13 5 - 33 U/L    AST 34 (H) <32 U/L    Total Bilirubin 0.54 0.3 - 1.2 mg/dL    Total Protein 5.7 (L) 6.4 - 8.3 g/dL    Alb 2.3 (L) 3.5 - 5.2 g/dL    Albumin/Globulin Ratio 0.7 (L) 1.0 - 2.5    GFR Non-African American >60 >60 mL/min    GFR African American >60 >60 mL/min    GFR Comment          GFR Staging         CBC auto differential   Result Value Ref Range    WBC 4.8 3.5 - 11.3 k/uL    RBC 3.98 3.95 - 5.11 m/uL    Hemoglobin 8.6 (L) 11.9 - 15.1 g/dL    Hematocrit 28.3 (L) 36.3 - 47.1 %    MCV 71.1 (L) 82.6 - 102.9 fL    MCH 21.6 (L) 25.2 - 33.5 pg    MCHC 30.4 28.4 - 34.8 g/dL    RDW 20.6 (H) 11.8 - 14.4 %    Platelets 044 621 - 172 k/uL    MPV 9.7 8.1 - 13.5 fL    NRBC Automated 0.4 (H) 0.0 per 100 WBC    Differential Type NOT REPORTED     WBC Morphology NOT REPORTED     RBC Morphology NOT REPORTED     Platelet Estimate NOT REPORTED     Seg Neutrophils 59 36 - 65 %    Lymphocytes 34 24 - 43 %    Monocytes 5 3 - 12 %    Eosinophils % 2 1 - 4 %    Immature Granulocytes 0 0 %    Basophils 0 0 - 2 %    Segs Absolute 2.83 1.50 - 8.10 k/uL    Absolute Lymph # 1.63 1.10 - 3.70 k/uL    Absolute Mono # 0.24 0.10 - 1.20 k/uL    Absolute Eos # 0.10 0.00 - 0.44 k/uL    Absolute Immature Granulocyte 0.00 0.00 - 0.30 k/uL    Basophils # 0.00 0.0 - 0.2 k/uL    Morphology 1+    Comprehensive Metabolic Panel w/ Reflex to MG   Result Value Ref Range    Glucose 87 70 - 99 mg/dL    BUN 3 (L) 6 - 20 mg/dL    CREATININE 0.29 (L) 0.50 - 0.90 mg/dL    Bun/Cre Ratio 10 9 - 20    Calcium 8.3 (L) 8.6 - 10.4 mg/dL    Sodium 137 135 - 144 mmol/L    Potassium

## 2018-03-05 ENCOUNTER — TELEPHONE (OUTPATIENT)
Dept: INFUSION THERAPY | Age: 23
End: 2018-03-05

## 2018-03-05 DIAGNOSIS — A41.9 SEPSIS, DUE TO UNSPECIFIED ORGANISM: ICD-10-CM

## 2018-03-05 RX ORDER — 0.9 % SODIUM CHLORIDE 0.9 %
5 VIAL (ML) INJECTION PRN
Status: CANCELLED | OUTPATIENT
Start: 2018-03-05

## 2018-03-05 RX ORDER — 0.9 % SODIUM CHLORIDE 0.9 %
10 VIAL (ML) INJECTION PRN
Status: CANCELLED | OUTPATIENT
Start: 2018-03-05

## 2018-03-05 NOTE — TELEPHONE ENCOUNTER
8:55 Called patient to schedule IV Rocephin daily for 4 days starting today. Patient answered telephone. States she was sleeping and needed to be at work at 9:45 AM today and will be unable to get rocephin today. Informed patient if she could be here in approx 20 min we could still have her out of here to be at work at [employer]. Patient states that she could not make it. Patient states she wants to start tomorrow at 8:30 am. Patient informed of how to get to outpatient dept and we will see her tomorrow at 8:30. States understanding.

## 2018-03-06 ENCOUNTER — TELEPHONE (OUTPATIENT)
Dept: INFUSION THERAPY | Age: 23
End: 2018-03-06

## 2018-03-06 ENCOUNTER — OFFICE VISIT (OUTPATIENT)
Dept: UROLOGY | Age: 23
End: 2018-03-06
Payer: COMMERCIAL

## 2018-03-06 ENCOUNTER — HOSPITAL ENCOUNTER (OUTPATIENT)
Dept: INFUSION THERAPY | Age: 23
Discharge: HOME | End: 2018-03-06
Payer: COMMERCIAL

## 2018-03-06 ENCOUNTER — HOSPITAL ENCOUNTER (OUTPATIENT)
Age: 23
Setting detail: SPECIMEN
Discharge: HOME | End: 2018-03-06
Payer: COMMERCIAL

## 2018-03-06 VITALS
HEART RATE: 90 BPM | TEMPERATURE: 96.1 F | SYSTOLIC BLOOD PRESSURE: 116 MMHG | DIASTOLIC BLOOD PRESSURE: 64 MMHG | RESPIRATION RATE: 18 BRPM

## 2018-03-06 VITALS — BODY MASS INDEX: 24.3 KG/M2 | DIASTOLIC BLOOD PRESSURE: 78 MMHG | WEIGHT: 146 LBS | SYSTOLIC BLOOD PRESSURE: 110 MMHG

## 2018-03-06 DIAGNOSIS — N13.30 HYDRONEPHROSIS OF RIGHT KIDNEY: ICD-10-CM

## 2018-03-06 DIAGNOSIS — N20.0 RENAL STONE: ICD-10-CM

## 2018-03-06 DIAGNOSIS — T83.84XA PAIN DUE TO URETERAL STENT, INITIAL ENCOUNTER (HCC): ICD-10-CM

## 2018-03-06 DIAGNOSIS — A41.9 SEPSIS, DUE TO UNSPECIFIED ORGANISM: ICD-10-CM

## 2018-03-06 DIAGNOSIS — N13.30 HYDRONEPHROSIS OF RIGHT KIDNEY: Primary | ICD-10-CM

## 2018-03-06 LAB
-: ABNORMAL
AMORPHOUS: ABNORMAL
BACTERIA: ABNORMAL
BILIRUBIN URINE: ABNORMAL
CASTS UA: ABNORMAL /LPF
COLOR: YELLOW
COMMENT UA: ABNORMAL
CRYSTALS, UA: ABNORMAL /HPF
EPITHELIAL CELLS UA: ABNORMAL /HPF (ref 0–25)
GLUCOSE URINE: ABNORMAL
KETONES, URINE: ABNORMAL
LEUKOCYTE ESTERASE, URINE: ABNORMAL
MUCUS: ABNORMAL
NITRITE, URINE: POSITIVE
OTHER OBSERVATIONS UA: ABNORMAL
PH UA: 6.5 (ref 5–9)
PROTEIN UA: ABNORMAL
RBC UA: ABNORMAL /HPF (ref 0–2)
RENAL EPITHELIAL, UA: ABNORMAL /HPF
SPECIFIC GRAVITY UA: 1.02 (ref 1.01–1.02)
TRICHOMONAS: ABNORMAL
TURBIDITY: ABNORMAL
URINE HGB: ABNORMAL
UROBILINOGEN, URINE: ABNORMAL
WBC UA: ABNORMAL /HPF (ref 0–5)
YEAST: ABNORMAL

## 2018-03-06 PROCEDURE — 96374 THER/PROPH/DIAG INJ IV PUSH: CPT

## 2018-03-06 PROCEDURE — 87086 URINE CULTURE/COLONY COUNT: CPT

## 2018-03-06 PROCEDURE — 2580000003 HC RX 258: Performed by: ADVANCED PRACTICE MIDWIFE

## 2018-03-06 PROCEDURE — G8420 CALC BMI NORM PARAMETERS: HCPCS | Performed by: NURSE PRACTITIONER

## 2018-03-06 PROCEDURE — 1111F DSCHRG MED/CURRENT MED MERGE: CPT | Performed by: NURSE PRACTITIONER

## 2018-03-06 PROCEDURE — 1036F TOBACCO NON-USER: CPT | Performed by: NURSE PRACTITIONER

## 2018-03-06 PROCEDURE — G8484 FLU IMMUNIZE NO ADMIN: HCPCS | Performed by: NURSE PRACTITIONER

## 2018-03-06 PROCEDURE — 6360000002 HC RX W HCPCS: Performed by: ADVANCED PRACTICE MIDWIFE

## 2018-03-06 PROCEDURE — G8428 CUR MEDS NOT DOCUMENT: HCPCS | Performed by: NURSE PRACTITIONER

## 2018-03-06 PROCEDURE — 99214 OFFICE O/P EST MOD 30 MIN: CPT | Performed by: NURSE PRACTITIONER

## 2018-03-06 PROCEDURE — 81001 URINALYSIS AUTO W/SCOPE: CPT

## 2018-03-06 RX ORDER — 0.9 % SODIUM CHLORIDE 0.9 %
5 VIAL (ML) INJECTION PRN
Status: CANCELLED | OUTPATIENT
Start: 2018-03-06

## 2018-03-06 RX ORDER — ACETAMINOPHEN AND CODEINE PHOSPHATE 300; 30 MG/1; MG/1
1 TABLET ORAL EVERY 4 HOURS PRN
Qty: 30 TABLET | Refills: 0 | Status: SHIPPED | OUTPATIENT
Start: 2018-03-06 | End: 2018-03-11

## 2018-03-06 RX ORDER — DOCUSATE SODIUM 100 MG/1
100 CAPSULE, LIQUID FILLED ORAL 3 TIMES DAILY PRN
Qty: 60 CAPSULE | Refills: 0 | Status: SHIPPED | OUTPATIENT
Start: 2018-03-06 | End: 2018-05-03

## 2018-03-06 RX ORDER — 0.9 % SODIUM CHLORIDE 0.9 %
10 VIAL (ML) INJECTION PRN
Status: DISCONTINUED | OUTPATIENT
Start: 2018-03-06 | End: 2018-03-07 | Stop reason: HOSPADM

## 2018-03-06 RX ORDER — 0.9 % SODIUM CHLORIDE 0.9 %
10 VIAL (ML) INJECTION PRN
Status: CANCELLED | OUTPATIENT
Start: 2018-03-06

## 2018-03-06 RX ADMIN — SODIUM CHLORIDE 1 G: 9 INJECTION INTRAMUSCULAR; INTRAVENOUS; SUBCUTANEOUS at 11:17

## 2018-03-06 RX ADMIN — Medication 10 ML: at 11:00

## 2018-03-06 RX ADMIN — Medication 10 ML: at 11:30

## 2018-03-06 ASSESSMENT — ENCOUNTER SYMPTOMS
BACK PAIN: 0
COLOR CHANGE: 0
ABDOMINAL PAIN: 0
COUGH: 0
VOMITING: 0
EYE REDNESS: 0
WHEEZING: 0
NAUSEA: 1
SHORTNESS OF BREATH: 0
CONSTIPATION: 0
EYE PAIN: 0

## 2018-03-06 NOTE — TELEPHONE ENCOUNTER
Vanessa did not show up for her appointment today at 0830. I called her at [phone]. She states \"I completely forgot about it and I will have to come on my lunch break\". I asked her when that would be, she states \"I can come any time\". I tell her anytime is fine with me. Vanessa states \"I will be there at 12\".

## 2018-03-06 NOTE — PLAN OF CARE
Problem: Tissue Perfusion - Risk of, Altered  Goal: Tissue perfusion - peripheral  Extent to which blood flows through the small vessels of the extremities  and maintains tissue function.     Outcome: Met This Shift

## 2018-03-06 NOTE — PROGRESS NOTES
When asked Summer, why she did not come yesterday to get her antibiotic,She stated \"I woke up late and I did not have time, to come and get it\". Reinforced the importance of why she needs to come and get her antibiotic. Informed her we could  accommodate her anytime,but that she needs to come and get her antibiotic. She states Patricia Boone will come at her lunch break and get it\". I informed her if she needs to comes earlier then noon that's ok, as long as she comes to get the medicine. [address] [facility] nurse that Summer did not come to get her antibiotic yesterday. She missed her apt ointment today and I called her. Also,imformed them that she did come today and get her antibiotic. I also informed them that I explained the importance of getting her antibiotic to her.

## 2018-03-06 NOTE — PROGRESS NOTES
provide her with Tylenol 3 for severe pain. I will also supply her with a stool softener to take with this medication. She was instructed to go to the ER for any fevers, vomiting, or worsening pain.

## 2018-03-07 ENCOUNTER — HOSPITAL ENCOUNTER (OUTPATIENT)
Dept: INFUSION THERAPY | Age: 23
Discharge: HOME | End: 2018-03-07
Payer: COMMERCIAL

## 2018-03-07 VITALS
HEART RATE: 81 BPM | TEMPERATURE: 95.5 F | SYSTOLIC BLOOD PRESSURE: 112 MMHG | DIASTOLIC BLOOD PRESSURE: 83 MMHG | RESPIRATION RATE: 20 BRPM

## 2018-03-07 DIAGNOSIS — A41.9 SEPSIS, DUE TO UNSPECIFIED ORGANISM: ICD-10-CM

## 2018-03-07 LAB
CULTURE: NORMAL
CULTURE: NORMAL
Lab: NORMAL
Lab: NORMAL
SPECIMEN DESCRIPTION: NORMAL
SPECIMEN DESCRIPTION: NORMAL
STATUS: NORMAL

## 2018-03-07 PROCEDURE — 2580000003 HC RX 258: Performed by: ADVANCED PRACTICE MIDWIFE

## 2018-03-07 PROCEDURE — 6360000002 HC RX W HCPCS: Performed by: ADVANCED PRACTICE MIDWIFE

## 2018-03-07 PROCEDURE — 96374 THER/PROPH/DIAG INJ IV PUSH: CPT

## 2018-03-07 RX ORDER — 0.9 % SODIUM CHLORIDE 0.9 %
10 VIAL (ML) INJECTION PRN
Status: DISCONTINUED | OUTPATIENT
Start: 2018-03-07 | End: 2018-03-08 | Stop reason: HOSPADM

## 2018-03-07 RX ORDER — 0.9 % SODIUM CHLORIDE 0.9 %
10 VIAL (ML) INJECTION PRN
Status: CANCELLED | OUTPATIENT
Start: 2018-03-07

## 2018-03-07 RX ORDER — 0.9 % SODIUM CHLORIDE 0.9 %
5 VIAL (ML) INJECTION PRN
Status: CANCELLED | OUTPATIENT
Start: 2018-03-07

## 2018-03-07 RX ADMIN — Medication 10 ML: at 14:48

## 2018-03-07 RX ADMIN — Medication 10 ML: at 14:35

## 2018-03-07 RX ADMIN — SODIUM CHLORIDE 1 G: 9 INJECTION INTRAMUSCULAR; INTRAVENOUS; SUBCUTANEOUS at 14:37

## 2018-03-08 ENCOUNTER — HOSPITAL ENCOUNTER (OUTPATIENT)
Dept: INFUSION THERAPY | Age: 23
Discharge: HOME | End: 2018-03-08
Payer: COMMERCIAL

## 2018-03-08 ENCOUNTER — TELEPHONE (OUTPATIENT)
Dept: UROLOGY | Age: 23
End: 2018-03-08

## 2018-03-08 VITALS
DIASTOLIC BLOOD PRESSURE: 68 MMHG | SYSTOLIC BLOOD PRESSURE: 102 MMHG | HEART RATE: 86 BPM | RESPIRATION RATE: 20 BRPM | TEMPERATURE: 98.1 F

## 2018-03-08 DIAGNOSIS — A41.9 SEPSIS, DUE TO UNSPECIFIED ORGANISM: ICD-10-CM

## 2018-03-08 PROCEDURE — 96365 THER/PROPH/DIAG IV INF INIT: CPT

## 2018-03-08 PROCEDURE — 2580000003 HC RX 258: Performed by: ADVANCED PRACTICE MIDWIFE

## 2018-03-08 PROCEDURE — 96374 THER/PROPH/DIAG INJ IV PUSH: CPT

## 2018-03-08 PROCEDURE — 6360000002 HC RX W HCPCS: Performed by: ADVANCED PRACTICE MIDWIFE

## 2018-03-08 RX ORDER — 0.9 % SODIUM CHLORIDE 0.9 %
5 VIAL (ML) INJECTION PRN
Status: CANCELLED | OUTPATIENT
Start: 2018-03-08

## 2018-03-08 RX ORDER — 0.9 % SODIUM CHLORIDE 0.9 %
10 VIAL (ML) INJECTION PRN
Status: DISCONTINUED | OUTPATIENT
Start: 2018-03-08 | End: 2018-03-09 | Stop reason: HOSPADM

## 2018-03-08 RX ORDER — 0.9 % SODIUM CHLORIDE 0.9 %
10 VIAL (ML) INJECTION PRN
Status: CANCELLED | OUTPATIENT
Start: 2018-03-08

## 2018-03-08 RX ADMIN — Medication 10 ML: at 14:00

## 2018-03-08 RX ADMIN — Medication 10 ML: at 13:35

## 2018-03-08 RX ADMIN — SODIUM CHLORIDE 1 G: 9 INJECTION INTRAMUSCULAR; INTRAVENOUS; SUBCUTANEOUS at 13:38

## 2018-03-08 NOTE — TELEPHONE ENCOUNTER
Missael Soriano from outpatient stopped in to let you know that patient would be getting last dose of Rocephin. Didn't know if you wanted her on an oral antibiotic after that.

## 2018-03-09 ENCOUNTER — HOSPITAL ENCOUNTER (OUTPATIENT)
Dept: INFUSION THERAPY | Age: 23
Discharge: HOME | End: 2018-03-09
Payer: COMMERCIAL

## 2018-03-09 VITALS — RESPIRATION RATE: 20 BRPM

## 2018-03-09 DIAGNOSIS — A41.9 SEPSIS, DUE TO UNSPECIFIED ORGANISM: ICD-10-CM

## 2018-03-09 PROCEDURE — 6360000002 HC RX W HCPCS: Performed by: ADVANCED PRACTICE MIDWIFE

## 2018-03-09 PROCEDURE — 96374 THER/PROPH/DIAG INJ IV PUSH: CPT

## 2018-03-09 PROCEDURE — 2580000003 HC RX 258: Performed by: ADVANCED PRACTICE MIDWIFE

## 2018-03-09 RX ORDER — 0.9 % SODIUM CHLORIDE 0.9 %
10 VIAL (ML) INJECTION PRN
Status: CANCELLED | OUTPATIENT
Start: 2018-03-09

## 2018-03-09 RX ORDER — 0.9 % SODIUM CHLORIDE 0.9 %
5 VIAL (ML) INJECTION PRN
Status: CANCELLED | OUTPATIENT
Start: 2018-03-09

## 2018-03-09 RX ORDER — 0.9 % SODIUM CHLORIDE 0.9 %
10 VIAL (ML) INJECTION PRN
Status: DISCONTINUED | OUTPATIENT
Start: 2018-03-09 | End: 2018-03-10 | Stop reason: HOSPADM

## 2018-03-09 RX ADMIN — SODIUM CHLORIDE 1 G: 9 INJECTION INTRAMUSCULAR; INTRAVENOUS; SUBCUTANEOUS at 15:24

## 2018-03-09 RX ADMIN — Medication 10 ML: at 15:21

## 2018-03-09 RX ADMIN — Medication 10 ML: at 15:30

## 2018-03-09 NOTE — PLAN OF CARE
Problem: Tissue Perfusion - Risk of, Altered  Goal: Tissue perfusion - peripheral  Extent to which blood flows through the small vessels of the extremities  and maintains tissue function.      Outcome: Completed Date Met: 03/09/18

## 2018-03-15 ENCOUNTER — TELEPHONE (OUTPATIENT)
Dept: OBGYN | Age: 23
End: 2018-03-15

## 2018-03-15 ENCOUNTER — HOSPITAL ENCOUNTER (OUTPATIENT)
Age: 23
Discharge: HOME | End: 2018-03-16
Attending: OBSTETRICS & GYNECOLOGY | Admitting: OBSTETRICS & GYNECOLOGY
Payer: COMMERCIAL

## 2018-03-15 DIAGNOSIS — Z67.91 RH NEGATIVE STATE IN ANTEPARTUM PERIOD, THIRD TRIMESTER: Primary | ICD-10-CM

## 2018-03-15 DIAGNOSIS — O26.893 RH NEGATIVE STATE IN ANTEPARTUM PERIOD, THIRD TRIMESTER: Primary | ICD-10-CM

## 2018-03-15 DIAGNOSIS — Z3A.27 27 WEEKS GESTATION OF PREGNANCY: ICD-10-CM

## 2018-03-15 PROBLEM — R31.9 HEMATURIA: Status: ACTIVE | Noted: 2018-03-15

## 2018-03-15 LAB
-: ABNORMAL
ABSOLUTE EOS #: 0.16 K/UL (ref 0–0.44)
ABSOLUTE IMMATURE GRANULOCYTE: 0.16 K/UL (ref 0–0.3)
ABSOLUTE LYMPH #: 1.84 K/UL (ref 1.1–3.7)
ABSOLUTE MONO #: 0.56 K/UL (ref 0.1–1.2)
ALBUMIN SERPL-MCNC: 3.3 G/DL (ref 3.5–5.2)
ALBUMIN/GLOBULIN RATIO: 0.9 (ref 1–2.5)
ALP BLD-CCNC: 111 U/L (ref 35–104)
ALT SERPL-CCNC: 55 U/L (ref 5–33)
AMORPHOUS: ABNORMAL
ANION GAP SERPL CALCULATED.3IONS-SCNC: 13 MMOL/L (ref 9–17)
AST SERPL-CCNC: 41 U/L
BACTERIA: ABNORMAL
BASOPHILS # BLD: 1 % (ref 0–2)
BASOPHILS ABSOLUTE: 0.08 K/UL (ref 0–0.2)
BILIRUB SERPL-MCNC: 0.48 MG/DL (ref 0.3–1.2)
BILIRUBIN URINE: NEGATIVE
BUN BLDV-MCNC: 7 MG/DL (ref 6–20)
BUN/CREAT BLD: 22 (ref 9–20)
CALCIUM SERPL-MCNC: 8.9 MG/DL (ref 8.6–10.4)
CASTS UA: ABNORMAL /LPF
CHLORIDE BLD-SCNC: 100 MMOL/L (ref 98–107)
CO2: 22 MMOL/L (ref 20–31)
COLOR: ABNORMAL
COMMENT UA: ABNORMAL
CREAT SERPL-MCNC: 0.32 MG/DL (ref 0.5–0.9)
CRYSTALS, UA: ABNORMAL /HPF
DIFFERENTIAL TYPE: ABNORMAL
EOSINOPHILS RELATIVE PERCENT: 2 % (ref 1–4)
EPITHELIAL CELLS UA: ABNORMAL /HPF (ref 0–25)
GFR AFRICAN AMERICAN: >60 ML/MIN
GFR NON-AFRICAN AMERICAN: >60 ML/MIN
GFR SERPL CREATININE-BSD FRML MDRD: ABNORMAL ML/MIN/{1.73_M2}
GFR SERPL CREATININE-BSD FRML MDRD: ABNORMAL ML/MIN/{1.73_M2}
GLUCOSE BLD-MCNC: 80 MG/DL (ref 70–99)
GLUCOSE URINE: NEGATIVE
HCT VFR BLD CALC: 30.3 % (ref 36.3–47.1)
HEMOGLOBIN: 9.1 G/DL (ref 11.9–15.1)
IMMATURE GRANULOCYTES: 2 %
KETONES, URINE: NEGATIVE
LEUKOCYTE ESTERASE, URINE: ABNORMAL
LYMPHOCYTES # BLD: 23 % (ref 24–43)
MCH RBC QN AUTO: 23.1 PG (ref 25.2–33.5)
MCHC RBC AUTO-ENTMCNC: 30 G/DL (ref 28.4–34.8)
MCV RBC AUTO: 76.9 FL (ref 82.6–102.9)
MONOCYTES # BLD: 7 % (ref 3–12)
MORPHOLOGY: ABNORMAL
MUCUS: ABNORMAL
NITRITE, URINE: POSITIVE
NRBC AUTOMATED: 0 PER 100 WBC
OTHER OBSERVATIONS UA: ABNORMAL
PDW BLD-RTO: 24.6 % (ref 11.8–14.4)
PH UA: 7 (ref 5–9)
PLATELET # BLD: 204 K/UL (ref 138–453)
PLATELET ESTIMATE: ABNORMAL
PMV BLD AUTO: 9.4 FL (ref 8.1–13.5)
POTASSIUM SERPL-SCNC: 3.9 MMOL/L (ref 3.7–5.3)
PROTEIN UA: ABNORMAL
RBC # BLD: 3.94 M/UL (ref 3.95–5.11)
RBC # BLD: ABNORMAL 10*6/UL
RBC UA: ABNORMAL /HPF (ref 0–2)
RENAL EPITHELIAL, UA: ABNORMAL /HPF
SEG NEUTROPHILS: 65 % (ref 36–65)
SEGMENTED NEUTROPHILS ABSOLUTE COUNT: 5.2 K/UL (ref 1.5–8.1)
SODIUM BLD-SCNC: 135 MMOL/L (ref 135–144)
SPECIFIC GRAVITY UA: >1.03 (ref 1.01–1.02)
TOTAL PROTEIN: 6.9 G/DL (ref 6.4–8.3)
TRICHOMONAS: ABNORMAL
TURBIDITY: ABNORMAL
URINE HGB: ABNORMAL
UROBILINOGEN, URINE: ABNORMAL
WBC # BLD: 8 K/UL (ref 3.5–11.3)
WBC # BLD: ABNORMAL 10*3/UL
WBC UA: ABNORMAL /HPF (ref 0–5)
YEAST: ABNORMAL

## 2018-03-15 PROCEDURE — 80306 DRUG TEST PRSMV INSTRMNT: CPT

## 2018-03-15 PROCEDURE — 81001 URINALYSIS AUTO W/SCOPE: CPT

## 2018-03-15 PROCEDURE — 80053 COMPREHEN METABOLIC PANEL: CPT

## 2018-03-15 PROCEDURE — 36415 COLL VENOUS BLD VENIPUNCTURE: CPT

## 2018-03-15 PROCEDURE — 81001 URINALYSIS AUTO W/SCOPE: CPT | Performed by: ADVANCED PRACTICE MIDWIFE

## 2018-03-15 PROCEDURE — 81001 URINALYSIS AUTO W/SCOPE: CPT | Performed by: UROLOGY

## 2018-03-15 PROCEDURE — 2580000003 HC RX 258: Performed by: ADVANCED PRACTICE MIDWIFE

## 2018-03-15 PROCEDURE — 85025 COMPLETE CBC W/AUTO DIFF WBC: CPT

## 2018-03-15 PROCEDURE — 87086 URINE CULTURE/COLONY COUNT: CPT

## 2018-03-15 PROCEDURE — 6360000002 HC RX W HCPCS: Performed by: UROLOGY

## 2018-03-15 RX ORDER — HYDROXYZINE PAMOATE 50 MG/1
50 CAPSULE ORAL ONCE
Status: COMPLETED | OUTPATIENT
Start: 2018-03-15 | End: 2018-03-16

## 2018-03-15 RX ORDER — SODIUM CHLORIDE, SODIUM LACTATE, POTASSIUM CHLORIDE, CALCIUM CHLORIDE 600; 310; 30; 20 MG/100ML; MG/100ML; MG/100ML; MG/100ML
INJECTION, SOLUTION INTRAVENOUS CONTINUOUS
Status: DISCONTINUED | OUTPATIENT
Start: 2018-03-15 | End: 2018-03-16 | Stop reason: HOSPADM

## 2018-03-15 RX ADMIN — SODIUM CHLORIDE, POTASSIUM CHLORIDE, SODIUM LACTATE AND CALCIUM CHLORIDE: 600; 310; 30; 20 INJECTION, SOLUTION INTRAVENOUS at 22:01

## 2018-03-15 RX ADMIN — CEFAZOLIN SODIUM 1 G: 1 SOLUTION INTRAVENOUS at 23:47

## 2018-03-16 VITALS
SYSTOLIC BLOOD PRESSURE: 96 MMHG | HEART RATE: 70 BPM | DIASTOLIC BLOOD PRESSURE: 52 MMHG | WEIGHT: 145 LBS | HEIGHT: 65 IN | BODY MASS INDEX: 24.16 KG/M2 | RESPIRATION RATE: 14 BRPM | TEMPERATURE: 98.1 F

## 2018-03-16 PROBLEM — Z3A.27 27 WEEKS GESTATION OF PREGNANCY: Status: ACTIVE | Noted: 2018-03-16

## 2018-03-16 LAB
AMPHETAMINE SCREEN URINE: NEGATIVE
BARBITURATE SCREEN URINE: NEGATIVE
BENZODIAZEPINE SCREEN, URINE: NEGATIVE
BUPRENORPHINE URINE: NEGATIVE
CANNABINOID SCREEN URINE: POSITIVE
COCAINE METABOLITE, URINE: NEGATIVE
CULTURE: NORMAL
CULTURE: NORMAL
GLUCOSE ADMINISTRATION: NORMAL
GLUCOSE TOLERANCE SCREEN 50G: 122 MG/DL (ref 70–135)
Lab: NORMAL
Lab: NORMAL
MDMA URINE: ABNORMAL
METHADONE SCREEN, URINE: NEGATIVE
METHAMPHETAMINE, URINE: NEGATIVE
OPIATES, URINE: NEGATIVE
OXYCODONE SCREEN URINE: NEGATIVE
PHENCYCLIDINE, URINE: NEGATIVE
PROPOXYPHENE, URINE: NEGATIVE
SPECIMEN DESCRIPTION: NORMAL
SPECIMEN DESCRIPTION: NORMAL
STATUS: NORMAL
TEST INFORMATION: ABNORMAL
TRICYCLIC ANTIDEPRESSANTS, UR: NEGATIVE

## 2018-03-16 PROCEDURE — 81001 URINALYSIS AUTO W/SCOPE: CPT | Performed by: ADVANCED PRACTICE MIDWIFE

## 2018-03-16 PROCEDURE — 82950 GLUCOSE TEST: CPT

## 2018-03-16 PROCEDURE — 81001 URINALYSIS AUTO W/SCOPE: CPT

## 2018-03-16 PROCEDURE — 6370000000 HC RX 637 (ALT 250 FOR IP): Performed by: ADVANCED PRACTICE MIDWIFE

## 2018-03-16 PROCEDURE — 2580000003 HC RX 258: Performed by: ADVANCED PRACTICE MIDWIFE

## 2018-03-16 PROCEDURE — 99218 PR INITIAL OBSERVATION CARE/DAY 30 MINUTES: CPT | Performed by: ADVANCED PRACTICE MIDWIFE

## 2018-03-16 PROCEDURE — 96372 THER/PROPH/DIAG INJ SC/IM: CPT

## 2018-03-16 PROCEDURE — 86901 BLOOD TYPING SEROLOGIC RH(D): CPT

## 2018-03-16 PROCEDURE — 6360000002 HC RX W HCPCS: Performed by: ADVANCED PRACTICE MIDWIFE

## 2018-03-16 PROCEDURE — 86850 RBC ANTIBODY SCREEN: CPT

## 2018-03-16 PROCEDURE — 6360000002 HC RX W HCPCS: Performed by: UROLOGY

## 2018-03-16 PROCEDURE — 81001 URINALYSIS AUTO W/SCOPE: CPT | Performed by: UROLOGY

## 2018-03-16 RX ADMIN — SODIUM CHLORIDE, POTASSIUM CHLORIDE, SODIUM LACTATE AND CALCIUM CHLORIDE: 600; 310; 30; 20 INJECTION, SOLUTION INTRAVENOUS at 09:17

## 2018-03-16 RX ADMIN — SODIUM CHLORIDE, POTASSIUM CHLORIDE, SODIUM LACTATE AND CALCIUM CHLORIDE: 600; 310; 30; 20 INJECTION, SOLUTION INTRAVENOUS at 02:10

## 2018-03-16 RX ADMIN — HUMAN RHO(D) IMMUNE GLOBULIN 300 MCG: 300 INJECTION, SOLUTION INTRAMUSCULAR at 14:49

## 2018-03-16 RX ADMIN — HYDROXYZINE PAMOATE 50 MG: 50 CAPSULE ORAL at 00:14

## 2018-03-16 RX ADMIN — CEFAZOLIN SODIUM 1 G: 1 SOLUTION INTRAVENOUS at 09:17

## 2018-03-16 NOTE — FLOWSHEET NOTE
Signs consent for tox screening. States she would like to be honest and tell us she had some pot brownies a few days ago. She states she didn't realize they had marijuana in them. Moved to room 201 and POC explained.

## 2018-03-16 NOTE — FLOWSHEET NOTE
Patient ambulatory to floor. States she had a stent put in about 2 weeks. States it hurt at the time and she followed up with [facility] and urine was tested and she was told to just drink more water and she was told that she currently didn't have a UTI. Pain worsened yesterday and had bleeding in her urine. Pain in lower abdomen and middle of back. Abdomen 6/10. Back pain 5/10. Only relief is when she is laying on her sides and she isnt up moving. Woke up this morning and didn't see blood initially with urination but as the day progressed it started to show more blood in urine. States feeling fetal movement. And unsuire if she is having contractions.

## 2018-03-16 NOTE — PROGRESS NOTES
Department of Obstetrics and Gynecology  Labor and Delivery  Triage Note      SUBJECTIVE:  Pt states she is feeling much better today, her urination and abdominal pain is better and her back pain is better. Pt states she did get some sleep last evening. OBJECTIVE    HISTORY OF PRESENT ILLNESS:      The patient is a 25 y.o. female at 35w2d.   OB History      Para Term  AB Living    4 3 2     3    SAB TAB Ectopic Molar Multiple Live Births              3      Patient presents with a chief complaint back and abdominal pain    Estimated Due Date: Estimated Date of Delivery: 18    Past Medical History:   Diagnosis Date    Anemia affecting third pregnancy        Vitals:  BP (!) 101/48   Pulse 79   Temp 98.1 °F (36.7 °C) (Oral)   Resp 18   Ht 5' 5\" (1.651 m)   Wt 145 lb (65.8 kg)   LMP 2017 (Approximate)   BMI 24.13 kg/m²     CONSTITUTIONAL:  awake, alert, cooperative, no apparent distress, and appears stated age  ABDOMEN:  No scars, normal bowel sounds, soft, non-distended, non-tender, no masses palpated, no hepatosplenomegally      Cervix:            Closed per RN last evening  NO vaginal bleeding        Membranes:  Intact    Fetal heart rate:    Baseline: 140    NST:  N/A    Contraction frequency: 0 minutes    DATA:  Results for orders placed or performed during the hospital encounter of 03/15/18   Urinalysis   Result Value Ref Range    Color, UA RED (A) YEL    Turbidity UA CLOUDY (A) CLEAR    Glucose, Ur NEGATIVE NEG    Bilirubin Urine NEGATIVE NEG    Ketones, Urine NEGATIVE NEG    Specific Gravity, UA >1.030 (H) 1.010 - 1.020    Urine Hgb 3+ (A) NEG    pH, UA 7.0 5.0 - 9.0    Protein, UA 3+ (A) NEG    Urobilinogen, Urine ELEVATED (A) NORM    Nitrite, Urine POSITIVE (A) NEG    Leukocyte Esterase, Urine SMALL (A) NEG    Urinalysis Comments NOT REPORTED    Microscopic Urinalysis   Result Value Ref Range    -          WBC, UA 20 TO 50 0 - 5 /HPF    RBC, UA GREATER THAN 100 0 - 2 /HPF

## 2018-03-16 NOTE — CONSULTS
Penney Gottron, MD at [facility] OR        Medications:    Scheduled Meds:   ceFAZolin  1 g Intravenous Q8H     Continuous Infusions:   lactated ringers 150 mL/hr at 03/16/18 0917     PRN Meds:. Allergies:    Patient has no known allergies. Social History:    Social History     Social History    Marital status: Single     Spouse name: N/A    Number of children: N/A    Years of education: N/A     Occupational History    Not on file. Social History Main Topics    Smoking status: Former Smoker     Packs/day: 0.50    Smokeless tobacco: Never Used    Alcohol use No    Drug use: No    Sexual activity: Not on file     Other Topics Concern    Not on file     Social History Narrative    No narrative on file       Family History:    History reviewed. No pertinent family history. REVIEW OF SYSTEMS:  Constitutional: Negative for fever, chills and unexpected weight change. Respiratory: Negative for shortness of breath and wheezing. Cardiovascular: Negative for chest pain and palpitations. Gastrointestinal: Negative for nausea, vomiting, abdominal pain, diarrhea, constipation and abdominal distention. Endocrine: Negative for polydipsia and polyuria. Genitourinary: Positive for right flank pain and gross hematuria. Negative for dysuria, urgency, frequency,  enuresis, decreased urine volume, vaginal discharge, genital sores. Musculoskeletal: Negative for myalgias and joint swelling. Skin: Negative for rash and wound. Neurological: Negative for dizziness and headaches. Hematological: Negative for adenopathy. Does not bruise/bleed easily.      PHYSICAL EXAM:  Patient Vitals for the past 24 hrs:   BP Temp Temp src Pulse Resp Height Weight   03/16/18 1008 (!) 96/52 98.1 °F (36.7 °C) Oral 70 14 - -   03/16/18 0603 (!) 101/48 98.1 °F (36.7 °C) Oral 79 18 - -   03/16/18 0212 (!) 106/55 98.4 °F (36.9 °C) Oral 84 16 - -   03/16/18 0211 - 98.3 °F (36.8 °C) Oral - - - -   03/15/18 2340 115/83 97.9 °F (36.6 °C)

## 2018-03-16 NOTE — PROGRESS NOTES
Pt presents at 27 2/7 weeks with history of stent placement on 2/28/18 per Dr. Mary Alice Dailey. Pt presents with severe back and abdominal pain with le blood in the urine. Consult was placed to Dr. Mary Alice Dailey - he had states previously that the pt may need lithotripsy.      No OB complications at this time

## 2018-03-17 LAB
ABO/RH: NORMAL
ANTIBODY SCREEN: NEGATIVE
BLD PROD TYP BPU: NORMAL
BLD PROD TYP BPU: NORMAL
DISPENSE STATUS BLOOD BANK: NORMAL
HISTORY CHECK: NORMAL
Lab: 1
TRANSFUSION STATUS: NORMAL
UNIT DIVISION: 0
UNIT NUMBER: NORMAL

## 2018-03-19 ENCOUNTER — ROUTINE PRENATAL (OUTPATIENT)
Dept: OBGYN | Age: 23
End: 2018-03-19
Payer: COMMERCIAL

## 2018-03-19 ENCOUNTER — INITIAL PRENATAL (OUTPATIENT)
Dept: OBGYN | Age: 23
End: 2018-03-19

## 2018-03-19 VITALS
BODY MASS INDEX: 25.13 KG/M2 | HEART RATE: 80 BPM | SYSTOLIC BLOOD PRESSURE: 116 MMHG | WEIGHT: 151 LBS | DIASTOLIC BLOOD PRESSURE: 68 MMHG

## 2018-03-19 VITALS — BODY MASS INDEX: 25.09 KG/M2 | WEIGHT: 150.8 LBS | DIASTOLIC BLOOD PRESSURE: 60 MMHG | SYSTOLIC BLOOD PRESSURE: 108 MMHG

## 2018-03-19 DIAGNOSIS — Z3A.27 27 WEEKS GESTATION OF PREGNANCY: ICD-10-CM

## 2018-03-19 DIAGNOSIS — D50.8 IRON DEFICIENCY ANEMIA SECONDARY TO INADEQUATE DIETARY IRON INTAKE: ICD-10-CM

## 2018-03-19 DIAGNOSIS — Z34.82 ENCOUNTER FOR SUPERVISION OF OTHER NORMAL PREGNANCY IN SECOND TRIMESTER: Primary | ICD-10-CM

## 2018-03-19 DIAGNOSIS — N20.0 KIDNEY STONE: ICD-10-CM

## 2018-03-19 PROCEDURE — 99214 OFFICE O/P EST MOD 30 MIN: CPT | Performed by: ADVANCED PRACTICE MIDWIFE

## 2018-03-19 PROCEDURE — G8484 FLU IMMUNIZE NO ADMIN: HCPCS | Performed by: ADVANCED PRACTICE MIDWIFE

## 2018-03-19 PROCEDURE — G8419 CALC BMI OUT NRM PARAM NOF/U: HCPCS | Performed by: ADVANCED PRACTICE MIDWIFE

## 2018-03-19 PROCEDURE — 1111F DSCHRG MED/CURRENT MED MERGE: CPT | Performed by: ADVANCED PRACTICE MIDWIFE

## 2018-03-19 PROCEDURE — G8427 DOCREV CUR MEDS BY ELIG CLIN: HCPCS | Performed by: ADVANCED PRACTICE MIDWIFE

## 2018-03-19 PROCEDURE — 1036F TOBACCO NON-USER: CPT | Performed by: ADVANCED PRACTICE MIDWIFE

## 2018-03-19 ASSESSMENT — PATIENT HEALTH QUESTIONNAIRE - PHQ9
1. LITTLE INTEREST OR PLEASURE IN DOING THINGS: 0
SUM OF ALL RESPONSES TO PHQ9 QUESTIONS 1 & 2: 0
SUM OF ALL RESPONSES TO PHQ QUESTIONS 1-9: 0
2. FEELING DOWN, DEPRESSED OR HOPELESS: 0

## 2018-03-19 NOTE — PROGRESS NOTES
Prenatal Lab Yes     Order additional testing if requested         Order Prenatal Vitamins   No: OTC          Appointment with Johny Sotelo  in 1 week for Dating U/S and total body exam if indicated      [facility]

## 2018-03-19 NOTE — PROGRESS NOTES
Brenton Marc is here at 27w6d for:    Chief Complaint   Patient presents with    Routine Prenatal Visit       Estimated Due Date: Estimated Date of Delivery: 18    OB History    Para Term  AB Living   4 3 2     3   SAB TAB Ectopic Molar Multiple Live Births             3      # Outcome Date GA Lbr Montez/2nd Weight Sex Delivery Anes PTL Lv   4 Current            3 Term 17 40w0d  7 lb 3 oz (3.26 kg) M Vag-Spont EPI N SCOTT   2 Term 01/27/15 40w0d  7 lb 5 oz (3.317 kg) F Vag-Spont EPI N SCOTT   1 Para 12 40w0d  7 lb 12 oz (3.515 kg) F Vag-Spont EPI N SCOTT           Past Medical History:   Diagnosis Date    Anemia affecting third pregnancy     Kidney stone        Past Surgical History:   Procedure Laterality Date    CYSTOURETHROSCOPY Right 2018    with stent placement    [place] OFFICE/OUTPT VISIT,PROCEDURE ONLY Right 2018    CYSTOSCOPY STENT INSERTION performed by Maribel Jeong MD at [address]       History   Smoking Status    Former Smoker    Packs/day: 0.50   Smokeless Tobacco    Never Used        History   Alcohol Use No       No results found for this visit on 18. Vitals:  /60   Wt 150 lb 12.8 oz (68.4 kg)   LMP 2017 (Approximate)   BMI 25.09 kg/m²   Estimated body mass index is 25.09 kg/m² as calculated from the following:    Height as of an earlier encounter on 3/19/18: 5' 5\" (1.651 m). Weight as of this encounter: 150 lb 12.8 oz (68.4 kg). Labs:    No results found for this visit on 18. HPI: here for initial ob visit, minimal or late care, delivered last three babies in [address] [place] [address], was hospitalized for urinary sepsis with large right ureteral stone and obstruction with stent placed, is due to have stent removed/ replaced next week.   Hx of severe anemia     PT denies fever, chills, nausea and vomiting       Abdomen: enlarged, gravid, soft, nontender  Total body exam completed please see prenatal physical exam tab in visit navigator

## 2018-03-26 ENCOUNTER — ANESTHESIA EVENT (OUTPATIENT)
Dept: OPERATING ROOM | Age: 23
End: 2018-03-26
Payer: COMMERCIAL

## 2018-03-26 PROBLEM — N20.1 URETERAL CALCULUS: Status: ACTIVE | Noted: 2018-03-26

## 2018-03-27 ENCOUNTER — APPOINTMENT (OUTPATIENT)
Dept: GENERAL RADIOLOGY | Age: 23
End: 2018-03-27
Attending: UROLOGY
Payer: COMMERCIAL

## 2018-03-27 ENCOUNTER — HOSPITAL ENCOUNTER (OUTPATIENT)
Age: 23
Setting detail: OUTPATIENT SURGERY
Discharge: HOME | End: 2018-03-27
Attending: UROLOGY | Admitting: UROLOGY
Payer: COMMERCIAL

## 2018-03-27 ENCOUNTER — ANESTHESIA (OUTPATIENT)
Dept: OPERATING ROOM | Age: 23
End: 2018-03-27
Payer: COMMERCIAL

## 2018-03-27 VITALS
TEMPERATURE: 97.6 F | SYSTOLIC BLOOD PRESSURE: 115 MMHG | HEIGHT: 65 IN | BODY MASS INDEX: 24.16 KG/M2 | HEART RATE: 108 BPM | RESPIRATION RATE: 20 BRPM | DIASTOLIC BLOOD PRESSURE: 69 MMHG | OXYGEN SATURATION: 99 % | WEIGHT: 145 LBS

## 2018-03-27 VITALS — SYSTOLIC BLOOD PRESSURE: 111 MMHG | DIASTOLIC BLOOD PRESSURE: 46 MMHG | OXYGEN SATURATION: 99 %

## 2018-03-27 PROCEDURE — 3600000004 HC SURGERY LEVEL 4 BASE: Performed by: UROLOGY

## 2018-03-27 PROCEDURE — 3700000000 HC ANESTHESIA ATTENDED CARE: Performed by: UROLOGY

## 2018-03-27 PROCEDURE — 7100000011 HC PHASE II RECOVERY - ADDTL 15 MIN: Performed by: UROLOGY

## 2018-03-27 PROCEDURE — 3600000014 HC SURGERY LEVEL 4 ADDTL 15MIN: Performed by: UROLOGY

## 2018-03-27 PROCEDURE — 7100000001 HC PACU RECOVERY - ADDTL 15 MIN: Performed by: UROLOGY

## 2018-03-27 PROCEDURE — C1769 GUIDE WIRE: HCPCS | Performed by: UROLOGY

## 2018-03-27 PROCEDURE — 6370000000 HC RX 637 (ALT 250 FOR IP): Performed by: UROLOGY

## 2018-03-27 PROCEDURE — 7100000010 HC PHASE II RECOVERY - FIRST 15 MIN: Performed by: UROLOGY

## 2018-03-27 PROCEDURE — 6360000002 HC RX W HCPCS: Performed by: UROLOGY

## 2018-03-27 PROCEDURE — 3700000001 HC ADD 15 MINUTES (ANESTHESIA): Performed by: UROLOGY

## 2018-03-27 PROCEDURE — 7100000000 HC PACU RECOVERY - FIRST 15 MIN: Performed by: UROLOGY

## 2018-03-27 PROCEDURE — 6360000002 HC RX W HCPCS: Performed by: NURSE ANESTHETIST, CERTIFIED REGISTERED

## 2018-03-27 PROCEDURE — 2580000003 HC RX 258: Performed by: UROLOGY

## 2018-03-27 PROCEDURE — 2580000003 HC RX 258: Performed by: NURSE ANESTHETIST, CERTIFIED REGISTERED

## 2018-03-27 RX ORDER — SODIUM CHLORIDE 0.9 % (FLUSH) 0.9 %
10 SYRINGE (ML) INJECTION EVERY 12 HOURS SCHEDULED
Status: DISCONTINUED | OUTPATIENT
Start: 2018-03-27 | End: 2018-03-27 | Stop reason: HOSPADM

## 2018-03-27 RX ORDER — SODIUM CHLORIDE 0.9 % (FLUSH) 0.9 %
10 SYRINGE (ML) INJECTION PRN
Status: DISCONTINUED | OUTPATIENT
Start: 2018-03-27 | End: 2018-03-27 | Stop reason: HOSPADM

## 2018-03-27 RX ORDER — ACETAMINOPHEN 500 MG
1000 TABLET ORAL ONCE
Status: COMPLETED | OUTPATIENT
Start: 2018-03-27 | End: 2018-03-27

## 2018-03-27 RX ORDER — SODIUM CHLORIDE, SODIUM LACTATE, POTASSIUM CHLORIDE, CALCIUM CHLORIDE 600; 310; 30; 20 MG/100ML; MG/100ML; MG/100ML; MG/100ML
INJECTION, SOLUTION INTRAVENOUS CONTINUOUS
Status: DISCONTINUED | OUTPATIENT
Start: 2018-03-27 | End: 2018-03-27 | Stop reason: HOSPADM

## 2018-03-27 RX ORDER — SODIUM CHLORIDE, SODIUM LACTATE, POTASSIUM CHLORIDE, CALCIUM CHLORIDE 600; 310; 30; 20 MG/100ML; MG/100ML; MG/100ML; MG/100ML
INJECTION, SOLUTION INTRAVENOUS CONTINUOUS PRN
Status: DISCONTINUED | OUTPATIENT
Start: 2018-03-27 | End: 2018-03-27 | Stop reason: SDUPTHER

## 2018-03-27 RX ADMIN — SODIUM CHLORIDE, POTASSIUM CHLORIDE, SODIUM LACTATE AND CALCIUM CHLORIDE: 600; 310; 30; 20 INJECTION, SOLUTION INTRAVENOUS at 13:39

## 2018-03-27 RX ADMIN — CEFAZOLIN SODIUM 2 G: 2 SOLUTION INTRAVENOUS at 13:35

## 2018-03-27 RX ADMIN — ACETAMINOPHEN 1000 MG: 500 TABLET ORAL at 12:50

## 2018-03-27 RX ADMIN — SODIUM CHLORIDE, POTASSIUM CHLORIDE, SODIUM LACTATE AND CALCIUM CHLORIDE: 600; 310; 30; 20 INJECTION, SOLUTION INTRAVENOUS at 12:51

## 2018-03-27 RX ADMIN — Medication 2 G: at 13:51

## 2018-03-27 ASSESSMENT — PULMONARY FUNCTION TESTS
PIF_VALUE: 0
PIF_VALUE: 1
PIF_VALUE: 0
PIF_VALUE: 1
PIF_VALUE: 0
PIF_VALUE: 0
PIF_VALUE: 1
PIF_VALUE: 0

## 2018-03-27 ASSESSMENT — PAIN SCALES - GENERAL
PAINLEVEL_OUTOF10: 0
PAINLEVEL_OUTOF10: 4
PAINLEVEL_OUTOF10: 0

## 2018-03-27 ASSESSMENT — PAIN - FUNCTIONAL ASSESSMENT: PAIN_FUNCTIONAL_ASSESSMENT: 0-10

## 2018-03-27 NOTE — ANESTHESIA PROCEDURE NOTES
Spinal Block    Start time: 3/27/2018 1:00 PM  Staffing  Resident/CRNA: Dee SALAZAR  Performed: resident/CRNA   Preanesthetic Checklist  Completed: patient identified, site marked, surgical consent, timeout performed, IV checked, risks and benefits discussed, monitors and equipment checked, anesthesia consent given, oxygen available and patient being monitored  Spinal Block  Patient position: sitting  Prep: Betadine  Patient monitoring: cardiac monitor, continuous pulse ox, continuous capnometry and frequent blood pressure checks  Approach: midline  Location: L3/L4  Provider prep: sterile gloves  Needle  Needle type: Pencan   Needle gauge: 25 G  Needle length: 3.5 in  Lot number: [id number]  Expiration date: 5/31/2019  Assessment  Sensory level: T10  Swirl obtained: Yes  CSF: clear  Attempts: 1  Hemodynamics: stable

## 2018-03-27 NOTE — ANESTHESIA PRE PROCEDURE
Right 2/28/2018    CYSTOSCOPY STENT INSERTION performed by Paulino Delgado MD at [facility] History:    Social History   Substance Use Topics    Smoking status: Former Smoker     Packs/day: 0.50    Smokeless tobacco: Never Used    Alcohol use No                                Counseling given: Not Answered      Vital Signs (Current):   Vitals:    03/19/18 1221 03/27/18 1246   BP:  113/71   Pulse:  87   Resp:  18   Temp:  36.1 °C (97 °F)   SpO2:  98%   Weight: 145 lb (65.8 kg) 145 lb (65.8 kg)   Height: 5' 5\" (1.651 m) 5' 5\" (1.651 m)                                              BP Readings from Last 3 Encounters:   03/27/18 113/71   03/19/18 108/60   03/19/18 116/68       NPO Status: Time of last liquid consumption: 2130                        Time of last solid consumption: 2130                        Date of last liquid consumption: 03/26/18                        Date of last solid food consumption: 03/26/18    BMI:   Wt Readings from Last 3 Encounters:   03/27/18 145 lb (65.8 kg)   03/19/18 150 lb 12.8 oz (68.4 kg)   03/19/18 151 lb (68.5 kg)     Body mass index is 24.13 kg/m². CBC:   Lab Results   Component Value Date    WBC 8.0 03/15/2018    RBC 3.94 03/15/2018    HGB 9.1 03/15/2018    HCT 30.3 03/15/2018    MCV 76.9 03/15/2018    RDW 24.6 03/15/2018     03/15/2018       CMP:   Lab Results   Component Value Date     03/15/2018    K 3.9 03/15/2018     03/15/2018    CO2 22 03/15/2018    BUN 7 03/15/2018    CREATININE 0.32 03/15/2018    GFRAA >60 03/15/2018    LABGLOM >60 03/15/2018    GLUCOSE 122 03/16/2018    GLUCOSE 80 03/15/2018    PROT 6.9 03/15/2018    CALCIUM 8.9 03/15/2018    BILITOT 0.48 03/15/2018    ALKPHOS 111 03/15/2018    AST 41 03/15/2018    ALT 55 03/15/2018       POC Tests: No results for input(s): POCGLU, POCNA, POCK, POCCL, POCBUN, POCHEMO, POCHCT in the last 72 hours.     Coags:   Lab Results   Component Value Date    PROTIME 10.7 02/28/2018    INR 1.0 02/28/2018    APTT 33.1 02/28/2018       HCG (If Applicable): No results found for: PREGTESTUR, PREGSERUM, HCG, HCGQUANT     ABGs:   Lab Results   Component Value Date    PHART 7.361 02/27/2018    PO2ART 106.3 02/27/2018    WAV8NIH 23.7 02/27/2018    DOD7TLY 13.1 02/27/2018    L7GZDRUR 97.8 02/27/2018        Type & Screen (If Applicable):  No results found for: LABABO, LABRH    Anesthesia Evaluation   no history of anesthetic complications:   Airway: Mallampati: III  TM distance: >3 FB   Neck ROM: full  Mouth opening: > = 3 FB Dental: normal exam         Pulmonary:Negative Pulmonary ROS and normal exam  breath sounds clear to auscultation                             Cardiovascular:  Exercise tolerance: good (>4 METS),                     Neuro/Psych:   Negative Neuro/Psych ROS              GI/Hepatic/Renal:            ROS comment: 29 weeks gestation . Endo/Other: Negative Endo/Other ROS                    Abdominal:           Vascular: negative vascular ROS. Anesthesia Plan      spinal     ASA 2             Anesthetic plan and risks discussed with patient.                       Jess Larson CRNA   3/27/2018

## 2018-03-27 NOTE — ANESTHESIA POSTPROCEDURE EVALUATION
Department of Anesthesiology  Postprocedure Note    Patient: Dorene Michelle  MRN: [id number]  YOB: 1995  Date of evaluation: 3/27/2018  Time:  1602    Procedure Summary     Date:  03/27/18 Room / Location:  [facility]    Anesthesia Start:  5695 Anesthesia Stop:  9991    Procedure:  CYSTOSCOPY URETEROSCOPY (Right ) Diagnosis:  (RIGHT URETERAL CALCULUS)    Surgeon:  Benita Kirkland MD Responsible Provider:  Deandre Nichols CRNA    Anesthesia Type:  spinal ASA Status:  2          Anesthesia Type: spinal    Eliud Phase I: Eliud Score: 8    Eliud Phase II:      Last vitals: Reviewed and per EMR flowsheets.        Anesthesia Post Evaluation    Patient location during evaluation: PACU  Patient participation: complete - patient participated  Level of consciousness: awake and alert  Airway patency: patent  Nausea & Vomiting: no nausea and no vomiting  Complications: no  Cardiovascular status: blood pressure returned to baseline and hemodynamically stable  Respiratory status: acceptable and room air  Hydration status: euvolemic

## 2018-03-28 NOTE — OP NOTE
[address] [address]                                 OPERATIVE REPORT    PATIENT NAME: Sabina Alegre                      :        1995  MED REC NO:   [id number]                              ROOM:  ACCOUNT NO:   [de-identified]                           ADMIT DATE: 2018  PROVIDER:     Zully Adrian    DATE OF PROCEDURE:  2018    PREOPERATIVE DIAGNOSES:  1. Right ureteral calculus. 2.  Right renal calculus. POSTOPERATIVE DIAGNOSIS:  None. PROCEDURES:  Cystoscopy, right ureteroscopy, right ureteral stent removal.    SURGEON: Zully Adrian    ASSISTANT:  None. ANESTHESIA:  Spinal.    COMPLICATIONS:  None. EBL:  Minimal.    SPECIMENS:  None. PROSTHESIS:  None. DISPOSITION:  Stable. FINDINGS:  No stone. INDICATIONS:  The patient is a 27-year-old female, who is about 6 months  pregnant, is here now for definitive stone therapy after having a stent  placed approximately 4 weeks ago. OPERATIVE PROCEDURE:  The patient was taken back to the operating room. Informed consent including all risks, benefits, and alternatives were  obtained. The patient was transferred from the [facility] onto the operating  room table, where she was induced under general anesthesia. She was given  IV Ancef for preoperative antibiotic prophylaxis. To begin the case, she  was prepped and draped in normal sterile fashion, placed in dorsal  lithotomy. A 22-Chinese sheath with a 30-degree lens passed through the  urethra into the bladder. Once in the bladder, the right ureteral stent  was identified, grasped, and removed through the meatus. A 0.035-inch wire  was passed up the stent into the kidney. The stent was then removed. Over  the Glidewire, dual lumen catheter was used to place additional Glidewire  up. We were able to then place semi-rigid scope up half the ureter. No  stones identified.   At this

## 2018-05-03 ENCOUNTER — TELEPHONE (OUTPATIENT)
Dept: OBGYN | Age: 23
End: 2018-05-03

## 2018-05-03 ENCOUNTER — ROUTINE PRENATAL (OUTPATIENT)
Dept: OBGYN | Age: 23
End: 2018-05-03
Payer: COMMERCIAL

## 2018-05-03 VITALS — DIASTOLIC BLOOD PRESSURE: 66 MMHG | SYSTOLIC BLOOD PRESSURE: 110 MMHG | BODY MASS INDEX: 26.46 KG/M2 | WEIGHT: 159 LBS

## 2018-05-03 DIAGNOSIS — O26.843 UTERINE SIZE-DATE DISCREPANCY IN THIRD TRIMESTER: Primary | ICD-10-CM

## 2018-05-03 DIAGNOSIS — Z3A.34 34 WEEKS GESTATION OF PREGNANCY: ICD-10-CM

## 2018-05-03 DIAGNOSIS — O43.93 PLACENTA DISORDER, THIRD TRIMESTER: ICD-10-CM

## 2018-05-03 LAB
ABDOMINAL CIRCUMFERENCE: 28.8 CM
BIPARIETAL DIAMETER: 8.8 CM
ESTIMATED FETAL WEIGHT: 2254 GRAMS
FEMORAL DIAMETER: NORMAL CM
HC/AC: 1.09
HEAD CIRCUMFERENCE: 31.2 CM

## 2018-05-03 PROCEDURE — 0502F SUBSEQUENT PRENATAL CARE: CPT | Performed by: ADVANCED PRACTICE MIDWIFE

## 2018-05-03 PROCEDURE — 76816 OB US FOLLOW-UP PER FETUS: CPT | Performed by: OBSTETRICS & GYNECOLOGY

## 2018-05-14 ENCOUNTER — HOSPITAL ENCOUNTER (OUTPATIENT)
Dept: ULTRASOUND IMAGING | Age: 23
Discharge: HOME | End: 2018-05-16
Payer: COMMERCIAL

## 2018-05-14 DIAGNOSIS — O26.849 UTERINE SIZE DATE DISCREPANCY PREGNANCY: ICD-10-CM

## 2018-05-14 DIAGNOSIS — O26.849 UTERINE SIZE DATE DISCREPANCY PREGNANCY: Primary | ICD-10-CM

## 2018-05-14 PROCEDURE — 76819 FETAL BIOPHYS PROFIL W/O NST: CPT

## 2018-05-15 ENCOUNTER — ROUTINE PRENATAL (OUTPATIENT)
Dept: OBGYN | Age: 23
End: 2018-05-15

## 2018-05-15 ENCOUNTER — HOSPITAL ENCOUNTER (OUTPATIENT)
Age: 23
Setting detail: SPECIMEN
Discharge: HOME | End: 2018-05-15
Payer: COMMERCIAL

## 2018-05-15 VITALS — WEIGHT: 163 LBS | BODY MASS INDEX: 27.12 KG/M2 | SYSTOLIC BLOOD PRESSURE: 122 MMHG | DIASTOLIC BLOOD PRESSURE: 76 MMHG

## 2018-05-15 DIAGNOSIS — Z34.83 ENCOUNTER FOR SUPERVISION OF OTHER NORMAL PREGNANCY IN THIRD TRIMESTER: Primary | ICD-10-CM

## 2018-05-15 DIAGNOSIS — Z3A.36 36 WEEKS GESTATION OF PREGNANCY: ICD-10-CM

## 2018-05-15 DIAGNOSIS — Z34.83 ENCOUNTER FOR SUPERVISION OF OTHER NORMAL PREGNANCY IN THIRD TRIMESTER: ICD-10-CM

## 2018-05-15 PROCEDURE — 0502F SUBSEQUENT PRENATAL CARE: CPT | Performed by: ADVANCED PRACTICE MIDWIFE

## 2018-05-15 PROCEDURE — 87081 CULTURE SCREEN ONLY: CPT

## 2018-05-18 LAB
CULTURE: NORMAL
CULTURE: NORMAL
Lab: NORMAL
SPECIMEN DESCRIPTION: NORMAL
SPECIMEN DESCRIPTION: NORMAL
STATUS: NORMAL

## 2018-05-22 ENCOUNTER — ROUTINE PRENATAL (OUTPATIENT)
Dept: OBGYN | Age: 23
End: 2018-05-22
Payer: COMMERCIAL

## 2018-05-22 ENCOUNTER — HOSPITAL ENCOUNTER (OUTPATIENT)
Age: 23
Discharge: HOME | End: 2018-05-22
Attending: OBSTETRICS & GYNECOLOGY | Admitting: OBSTETRICS & GYNECOLOGY
Payer: COMMERCIAL

## 2018-05-22 VITALS — SYSTOLIC BLOOD PRESSURE: 116 MMHG | DIASTOLIC BLOOD PRESSURE: 73 MMHG | HEART RATE: 88 BPM

## 2018-05-22 VITALS — WEIGHT: 160 LBS | BODY MASS INDEX: 26.63 KG/M2 | DIASTOLIC BLOOD PRESSURE: 78 MMHG | SYSTOLIC BLOOD PRESSURE: 122 MMHG

## 2018-05-22 DIAGNOSIS — O43.93 PLACENTA DISORDER, THIRD TRIMESTER: ICD-10-CM

## 2018-05-22 DIAGNOSIS — O26.843 UTERINE SIZE-DATE DISCREPANCY IN THIRD TRIMESTER: Primary | ICD-10-CM

## 2018-05-22 DIAGNOSIS — Z3A.37 37 WEEKS GESTATION OF PREGNANCY: ICD-10-CM

## 2018-05-22 PROBLEM — N93.9 VAGINAL BLEEDING: Status: ACTIVE | Noted: 2018-05-22

## 2018-05-22 LAB
ABDOMINAL CIRCUMFERENCE: 30.72 CM
BIPARIETAL DIAMETER: 9.38 CM
ESTIMATED FETAL WEIGHT: 2675 GRAMS
FEMORAL DIAMETER: ABNORMAL CM
HC/AC: 1.08
HEAD CIRCUMFERENCE: 33.22 CM

## 2018-05-22 PROCEDURE — G8419 CALC BMI OUT NRM PARAM NOF/U: HCPCS | Performed by: ADVANCED PRACTICE MIDWIFE

## 2018-05-22 PROCEDURE — 1036F TOBACCO NON-USER: CPT | Performed by: ADVANCED PRACTICE MIDWIFE

## 2018-05-22 PROCEDURE — G8427 DOCREV CUR MEDS BY ELIG CLIN: HCPCS | Performed by: ADVANCED PRACTICE MIDWIFE

## 2018-05-22 PROCEDURE — 59025 FETAL NON-STRESS TEST: CPT

## 2018-05-22 PROCEDURE — 99213 OFFICE O/P EST LOW 20 MIN: CPT

## 2018-05-22 PROCEDURE — 0502F SUBSEQUENT PRENATAL CARE: CPT | Performed by: ADVANCED PRACTICE MIDWIFE

## 2018-05-22 PROCEDURE — 76819 FETAL BIOPHYS PROFIL W/O NST: CPT | Performed by: OBSTETRICS & GYNECOLOGY

## 2018-05-22 PROCEDURE — 76815 OB US LIMITED FETUS(S): CPT | Performed by: OBSTETRICS & GYNECOLOGY

## 2018-05-22 PROCEDURE — 59025 FETAL NON-STRESS TEST: CPT | Performed by: ADVANCED PRACTICE MIDWIFE

## 2018-05-24 ENCOUNTER — ROUTINE PRENATAL (OUTPATIENT)
Dept: OBGYN | Age: 23
End: 2018-05-24
Payer: COMMERCIAL

## 2018-05-24 ENCOUNTER — HOSPITAL ENCOUNTER (OUTPATIENT)
Age: 23
Setting detail: SPECIMEN
Discharge: HOME | End: 2018-05-24
Payer: COMMERCIAL

## 2018-05-24 VITALS — WEIGHT: 160 LBS | BODY MASS INDEX: 26.63 KG/M2 | SYSTOLIC BLOOD PRESSURE: 120 MMHG | DIASTOLIC BLOOD PRESSURE: 78 MMHG

## 2018-05-24 DIAGNOSIS — R82.90 ABNORMAL URINALYSIS: ICD-10-CM

## 2018-05-24 DIAGNOSIS — R82.90 ABNORMAL URINALYSIS: Primary | ICD-10-CM

## 2018-05-24 DIAGNOSIS — Z34.83 ENCOUNTER FOR SUPERVISION OF OTHER NORMAL PREGNANCY IN THIRD TRIMESTER: ICD-10-CM

## 2018-05-24 DIAGNOSIS — Z3A.37 37 WEEKS GESTATION OF PREGNANCY: ICD-10-CM

## 2018-05-24 LAB
BILIRUBIN, POC: ABNORMAL
BLOOD URINE, POC: ABNORMAL
CLARITY, POC: CLEAR
COLOR, POC: YELLOW
GLUCOSE URINE, POC: ABNORMAL
KETONES, POC: ABNORMAL
LEUKOCYTE EST, POC: ABNORMAL
NITRITE, POC: ABNORMAL
PH, POC: 6
PROTEIN, POC: ABNORMAL
SPECIFIC GRAVITY, POC: 1.01
UROBILINOGEN, POC: ABNORMAL

## 2018-05-24 PROCEDURE — 59025 FETAL NON-STRESS TEST: CPT | Performed by: ADVANCED PRACTICE MIDWIFE

## 2018-05-24 PROCEDURE — 81002 URINALYSIS NONAUTO W/O SCOPE: CPT | Performed by: ADVANCED PRACTICE MIDWIFE

## 2018-05-24 PROCEDURE — 0502F SUBSEQUENT PRENATAL CARE: CPT | Performed by: ADVANCED PRACTICE MIDWIFE

## 2018-05-24 PROCEDURE — 87086 URINE CULTURE/COLONY COUNT: CPT

## 2018-05-24 RX ORDER — CEPHALEXIN 500 MG/1
500 CAPSULE ORAL 3 TIMES DAILY
Qty: 21 CAPSULE | Refills: 0 | Status: SHIPPED | OUTPATIENT
Start: 2018-05-24 | End: 2020-02-03 | Stop reason: ALTCHOICE

## 2018-05-30 ENCOUNTER — ANESTHESIA (OUTPATIENT)
Dept: LABOR AND DELIVERY | Age: 23
DRG: 560 | End: 2018-05-30
Payer: COMMERCIAL

## 2018-05-30 ENCOUNTER — ROUTINE PRENATAL (OUTPATIENT)
Dept: OBGYN | Age: 23
End: 2018-05-30
Payer: COMMERCIAL

## 2018-05-30 ENCOUNTER — HOSPITAL ENCOUNTER (INPATIENT)
Age: 23
LOS: 2 days | Discharge: HOME | DRG: 560 | End: 2018-06-01
Attending: OBSTETRICS & GYNECOLOGY | Admitting: OBSTETRICS & GYNECOLOGY
Payer: COMMERCIAL

## 2018-05-30 ENCOUNTER — ANESTHESIA EVENT (OUTPATIENT)
Dept: LABOR AND DELIVERY | Age: 23
DRG: 560 | End: 2018-05-30
Payer: COMMERCIAL

## 2018-05-30 VITALS — BODY MASS INDEX: 26.63 KG/M2 | SYSTOLIC BLOOD PRESSURE: 120 MMHG | WEIGHT: 160 LBS | DIASTOLIC BLOOD PRESSURE: 76 MMHG

## 2018-05-30 DIAGNOSIS — O43.93 PLACENTA DISORDER, THIRD TRIMESTER: Primary | ICD-10-CM

## 2018-05-30 DIAGNOSIS — Z3A.38 38 WEEKS GESTATION OF PREGNANCY: ICD-10-CM

## 2018-05-30 DIAGNOSIS — D50.8 IRON DEFICIENCY ANEMIA SECONDARY TO INADEQUATE DIETARY IRON INTAKE: ICD-10-CM

## 2018-05-30 PROBLEM — Z34.93 ENCOUNTER FOR SUPERVISION OF NORMAL PREGNANCY IN THIRD TRIMESTER: Status: ACTIVE | Noted: 2018-05-30

## 2018-05-30 PROBLEM — Z37.9 NORMAL LABOR: Status: ACTIVE | Noted: 2018-05-30

## 2018-05-30 LAB
ABO/RH: NORMAL
ABSOLUTE EOS #: 0.18 K/UL (ref 0–0.44)
ABSOLUTE IMMATURE GRANULOCYTE: 0.09 K/UL (ref 0–0.3)
ABSOLUTE LYMPH #: 2.06 K/UL (ref 1.1–3.7)
ABSOLUTE MONO #: 0.49 K/UL (ref 0.1–1.2)
AMPHETAMINE SCREEN URINE: NEGATIVE
ANTIBODY SCREEN: NEGATIVE
ARM BAND NUMBER: NORMAL
BARBITURATE SCREEN URINE: NEGATIVE
BASOPHILS # BLD: 0 % (ref 0–2)
BASOPHILS ABSOLUTE: 0.03 K/UL (ref 0–0.2)
BENZODIAZEPINE SCREEN, URINE: NEGATIVE
BUPRENORPHINE URINE: NEGATIVE
CANNABINOID SCREEN URINE: NEGATIVE
COCAINE METABOLITE, URINE: NEGATIVE
DIFFERENTIAL TYPE: ABNORMAL
EOSINOPHILS RELATIVE PERCENT: 2 % (ref 1–4)
EXPIRATION DATE: NORMAL
HCT VFR BLD CALC: 27 % (ref 36.3–47.1)
HEMOGLOBIN: 8.6 G/DL (ref 11.9–15.1)
IMMATURE GRANULOCYTES: 1 %
LYMPHOCYTES # BLD: 23 % (ref 24–43)
MCH RBC QN AUTO: 24.4 PG (ref 25.2–33.5)
MCHC RBC AUTO-ENTMCNC: 31.9 G/DL (ref 28.4–34.8)
MCV RBC AUTO: 76.7 FL (ref 82.6–102.9)
MDMA URINE: NORMAL
METHADONE SCREEN, URINE: NEGATIVE
METHAMPHETAMINE, URINE: NEGATIVE
MONOCYTES # BLD: 5 % (ref 3–12)
NRBC AUTOMATED: 0 PER 100 WBC
OPIATES, URINE: NEGATIVE
OXYCODONE SCREEN URINE: NEGATIVE
PDW BLD-RTO: 16.3 % (ref 11.8–14.4)
PHENCYCLIDINE, URINE: NEGATIVE
PLATELET # BLD: 211 K/UL (ref 138–453)
PLATELET ESTIMATE: ABNORMAL
PMV BLD AUTO: 9.6 FL (ref 8.1–13.5)
PROPOXYPHENE, URINE: NEGATIVE
RBC # BLD: 3.52 M/UL (ref 3.95–5.11)
RBC # BLD: ABNORMAL 10*6/UL
SEG NEUTROPHILS: 69 % (ref 36–65)
SEGMENTED NEUTROPHILS ABSOLUTE COUNT: 6.15 K/UL (ref 1.5–8.1)
TEST INFORMATION: NORMAL
TRICYCLIC ANTIDEPRESSANTS, UR: NEGATIVE
WBC # BLD: 9 K/UL (ref 3.5–11.3)
WBC # BLD: ABNORMAL 10*3/UL

## 2018-05-30 PROCEDURE — 0502F SUBSEQUENT PRENATAL CARE: CPT | Performed by: ADVANCED PRACTICE MIDWIFE

## 2018-05-30 PROCEDURE — 6360000002 HC RX W HCPCS: Performed by: NURSE ANESTHETIST, CERTIFIED REGISTERED

## 2018-05-30 PROCEDURE — 6360000002 HC RX W HCPCS: Performed by: ADVANCED PRACTICE MIDWIFE

## 2018-05-30 PROCEDURE — 3700000025 ANESTHESIA EPIDURAL BLOCK: Performed by: NURSE ANESTHETIST, CERTIFIED REGISTERED

## 2018-05-30 PROCEDURE — 7200000001 HC VAGINAL DELIVERY

## 2018-05-30 PROCEDURE — 1036F TOBACCO NON-USER: CPT | Performed by: ADVANCED PRACTICE MIDWIFE

## 2018-05-30 PROCEDURE — 86850 RBC ANTIBODY SCREEN: CPT

## 2018-05-30 PROCEDURE — 86900 BLOOD TYPING SEROLOGIC ABO: CPT

## 2018-05-30 PROCEDURE — 86901 BLOOD TYPING SEROLOGIC RH(D): CPT

## 2018-05-30 PROCEDURE — 51701 INSERT BLADDER CATHETER: CPT

## 2018-05-30 PROCEDURE — 1220000000 HC SEMI PRIVATE OB R&B

## 2018-05-30 PROCEDURE — 76819 FETAL BIOPHYS PROFIL W/O NST: CPT | Performed by: OBSTETRICS & GYNECOLOGY

## 2018-05-30 PROCEDURE — 80306 DRUG TEST PRSMV INSTRMNT: CPT

## 2018-05-30 PROCEDURE — 59400 OBSTETRICAL CARE: CPT | Performed by: ADVANCED PRACTICE MIDWIFE

## 2018-05-30 PROCEDURE — G8419 CALC BMI OUT NRM PARAM NOF/U: HCPCS | Performed by: ADVANCED PRACTICE MIDWIFE

## 2018-05-30 PROCEDURE — 36415 COLL VENOUS BLD VENIPUNCTURE: CPT

## 2018-05-30 PROCEDURE — 2580000003 HC RX 258: Performed by: ADVANCED PRACTICE MIDWIFE

## 2018-05-30 PROCEDURE — 88307 TISSUE EXAM BY PATHOLOGIST: CPT

## 2018-05-30 PROCEDURE — G8427 DOCREV CUR MEDS BY ELIG CLIN: HCPCS | Performed by: ADVANCED PRACTICE MIDWIFE

## 2018-05-30 PROCEDURE — 2500000003 HC RX 250 WO HCPCS: Performed by: NURSE ANESTHETIST, CERTIFIED REGISTERED

## 2018-05-30 PROCEDURE — 85025 COMPLETE CBC W/AUTO DIFF WBC: CPT

## 2018-05-30 RX ORDER — SODIUM CHLORIDE 0.9 % (FLUSH) 0.9 %
10 SYRINGE (ML) INJECTION EVERY 12 HOURS SCHEDULED
Status: DISCONTINUED | OUTPATIENT
Start: 2018-05-30 | End: 2018-06-01 | Stop reason: HOSPADM

## 2018-05-30 RX ORDER — ONDANSETRON 2 MG/ML
4 INJECTION INTRAMUSCULAR; INTRAVENOUS EVERY 6 HOURS PRN
Status: DISCONTINUED | OUTPATIENT
Start: 2018-05-30 | End: 2018-06-01 | Stop reason: HOSPADM

## 2018-05-30 RX ORDER — SODIUM CHLORIDE 0.9 % (FLUSH) 0.9 %
10 SYRINGE (ML) INJECTION PRN
Status: DISCONTINUED | OUTPATIENT
Start: 2018-05-30 | End: 2018-06-01 | Stop reason: HOSPADM

## 2018-05-30 RX ORDER — METHYLERGONOVINE MALEATE 0.2 MG/ML
200 INJECTION INTRAVENOUS
Status: ACTIVE | OUTPATIENT
Start: 2018-05-30 | End: 2018-05-30

## 2018-05-30 RX ORDER — ACETAMINOPHEN 325 MG/1
650 TABLET ORAL EVERY 4 HOURS PRN
Status: DISCONTINUED | OUTPATIENT
Start: 2018-05-30 | End: 2018-06-01 | Stop reason: HOSPADM

## 2018-05-30 RX ORDER — FENTANYL CITRATE 50 UG/ML
INJECTION, SOLUTION INTRAMUSCULAR; INTRAVENOUS PRN
Status: DISCONTINUED | OUTPATIENT
Start: 2018-05-30 | End: 2018-05-30 | Stop reason: SDUPTHER

## 2018-05-30 RX ORDER — DOCUSATE SODIUM 100 MG/1
100 CAPSULE, LIQUID FILLED ORAL 2 TIMES DAILY PRN
Status: DISCONTINUED | OUTPATIENT
Start: 2018-05-30 | End: 2018-06-01 | Stop reason: HOSPADM

## 2018-05-30 RX ORDER — CARBOPROST TROMETHAMINE 250 UG/ML
250 INJECTION, SOLUTION INTRAMUSCULAR PRN
Status: DISCONTINUED | OUTPATIENT
Start: 2018-05-30 | End: 2018-06-01 | Stop reason: HOSPADM

## 2018-05-30 RX ORDER — SODIUM CHLORIDE, SODIUM LACTATE, POTASSIUM CHLORIDE, CALCIUM CHLORIDE 600; 310; 30; 20 MG/100ML; MG/100ML; MG/100ML; MG/100ML
INJECTION, SOLUTION INTRAVENOUS PRN
Status: DISCONTINUED | OUTPATIENT
Start: 2018-05-30 | End: 2018-06-01 | Stop reason: HOSPADM

## 2018-05-30 RX ORDER — NALOXONE HYDROCHLORIDE 0.4 MG/ML
0.4 INJECTION, SOLUTION INTRAMUSCULAR; INTRAVENOUS; SUBCUTANEOUS PRN
Status: DISCONTINUED | OUTPATIENT
Start: 2018-05-30 | End: 2018-06-01 | Stop reason: HOSPADM

## 2018-05-30 RX ORDER — METHYLERGONOVINE MALEATE 0.2 MG/ML
200 INJECTION INTRAVENOUS PRN
Status: DISCONTINUED | OUTPATIENT
Start: 2018-05-30 | End: 2018-06-01 | Stop reason: HOSPADM

## 2018-05-30 RX ORDER — LIDOCAINE HYDROCHLORIDE 10 MG/ML
30 INJECTION, SOLUTION EPIDURAL; INFILTRATION; INTRACAUDAL; PERINEURAL PRN
Status: DISCONTINUED | OUTPATIENT
Start: 2018-05-30 | End: 2018-06-01 | Stop reason: HOSPADM

## 2018-05-30 RX ORDER — MISOPROSTOL 100 UG/1
900 TABLET ORAL PRN
Status: DISCONTINUED | OUTPATIENT
Start: 2018-05-30 | End: 2018-06-01 | Stop reason: HOSPADM

## 2018-05-30 RX ORDER — IBUPROFEN 800 MG/1
800 TABLET ORAL EVERY 8 HOURS
Status: DISCONTINUED | OUTPATIENT
Start: 2018-05-30 | End: 2018-06-01 | Stop reason: HOSPADM

## 2018-05-30 RX ORDER — LANOLIN 100 %
OINTMENT (GRAM) TOPICAL PRN
Status: DISCONTINUED | OUTPATIENT
Start: 2018-05-30 | End: 2018-06-01 | Stop reason: HOSPADM

## 2018-05-30 RX ORDER — EPHEDRINE SULFATE 50 MG/ML
5 INJECTION, SOLUTION INTRAVENOUS EVERY 5 MIN PRN
Status: DISCONTINUED | OUTPATIENT
Start: 2018-05-30 | End: 2018-06-01 | Stop reason: HOSPADM

## 2018-05-30 RX ORDER — LIDOCAINE HYDROCHLORIDE 20 MG/ML
INJECTION, SOLUTION EPIDURAL; INFILTRATION; INTRACAUDAL; PERINEURAL PRN
Status: DISCONTINUED | OUTPATIENT
Start: 2018-05-30 | End: 2018-05-30 | Stop reason: SDUPTHER

## 2018-05-30 RX ORDER — LIDOCAINE HYDROCHLORIDE AND EPINEPHRINE 20; 5 MG/ML; UG/ML
INJECTION, SOLUTION EPIDURAL; INFILTRATION; INTRACAUDAL; PERINEURAL PRN
Status: DISCONTINUED | OUTPATIENT
Start: 2018-05-30 | End: 2018-05-30 | Stop reason: SDUPTHER

## 2018-05-30 RX ORDER — NALBUPHINE HCL 10 MG/ML
10 AMPUL (ML) INJECTION
Status: DISCONTINUED | OUTPATIENT
Start: 2018-05-30 | End: 2018-06-01 | Stop reason: HOSPADM

## 2018-05-30 RX ORDER — ACETAMINOPHEN 325 MG/1
650 TABLET ORAL EVERY 4 HOURS PRN
Status: DISCONTINUED | OUTPATIENT
Start: 2018-05-30 | End: 2018-05-30

## 2018-05-30 RX ADMIN — LIDOCAINE HYDROCHLORIDE 3 ML: 20 INJECTION, SOLUTION EPIDURAL; INFILTRATION; INTRACAUDAL; PERINEURAL at 22:10

## 2018-05-30 RX ADMIN — Medication 333 MILLI-UNITS/MIN: at 22:31

## 2018-05-30 RX ADMIN — FENTANYL CITRATE 100 MCG: 50 INJECTION INTRAMUSCULAR; INTRAVENOUS at 22:10

## 2018-05-30 RX ADMIN — METHYLERGONOVINE MALEATE 200 MCG: 0.2 INJECTION INTRAVENOUS at 22:43

## 2018-05-30 RX ADMIN — LIDOCAINE HYDROCHLORIDE,EPINEPHRINE BITARTRATE 3 ML: 20; .005 INJECTION, SOLUTION EPIDURAL; INFILTRATION; INTRACAUDAL; PERINEURAL at 21:50

## 2018-05-30 RX ADMIN — LIDOCAINE HYDROCHLORIDE,EPINEPHRINE BITARTRATE 3 ML: 20; .005 INJECTION, SOLUTION EPIDURAL; INFILTRATION; INTRACAUDAL; PERINEURAL at 21:53

## 2018-05-30 RX ADMIN — LIDOCAINE HYDROCHLORIDE 2 ML: 20 INJECTION, SOLUTION EPIDURAL; INFILTRATION; INTRACAUDAL; PERINEURAL at 21:54

## 2018-05-30 RX ADMIN — SODIUM CHLORIDE, POTASSIUM CHLORIDE, SODIUM LACTATE AND CALCIUM CHLORIDE: 600; 310; 30; 20 INJECTION, SOLUTION INTRAVENOUS at 20:43

## 2018-05-30 RX ADMIN — LIDOCAINE HYDROCHLORIDE 3 ML: 20 INJECTION, SOLUTION EPIDURAL; INFILTRATION; INTRACAUDAL; PERINEURAL at 21:46

## 2018-05-30 RX ADMIN — SODIUM CHLORIDE, POTASSIUM CHLORIDE, SODIUM LACTATE AND CALCIUM CHLORIDE: 600; 310; 30; 20 INJECTION, SOLUTION INTRAVENOUS at 22:16

## 2018-05-31 LAB
ABSOLUTE EOS #: 0.11 K/UL (ref 0–0.44)
ABSOLUTE IMMATURE GRANULOCYTE: 0.08 K/UL (ref 0–0.3)
ABSOLUTE LYMPH #: 2.72 K/UL (ref 1.1–3.7)
ABSOLUTE MONO #: 0.97 K/UL (ref 0.1–1.2)
BASOPHILS # BLD: 0 % (ref 0–2)
BASOPHILS ABSOLUTE: 0.03 K/UL (ref 0–0.2)
DIFFERENTIAL TYPE: ABNORMAL
EOSINOPHILS RELATIVE PERCENT: 1 % (ref 1–4)
FETAL ROSETTE: NEGATIVE
HCT VFR BLD CALC: 27.7 % (ref 36.3–47.1)
HEMOGLOBIN: 8.8 G/DL (ref 11.9–15.1)
IMMATURE GRANULOCYTES: 1 %
LYMPHOCYTES # BLD: 22 % (ref 24–43)
MCH RBC QN AUTO: 24.4 PG (ref 25.2–33.5)
MCHC RBC AUTO-ENTMCNC: 31.8 G/DL (ref 28.4–34.8)
MCV RBC AUTO: 76.9 FL (ref 82.6–102.9)
MONOCYTES # BLD: 8 % (ref 3–12)
NRBC AUTOMATED: 0.2 PER 100 WBC
PDW BLD-RTO: 16 % (ref 11.8–14.4)
PLATELET # BLD: 178 K/UL (ref 138–453)
PLATELET ESTIMATE: ABNORMAL
PMV BLD AUTO: 9.9 FL (ref 8.1–13.5)
RBC # BLD: 3.6 M/UL (ref 3.95–5.11)
RBC # BLD: ABNORMAL 10*6/UL
SEG NEUTROPHILS: 68 % (ref 36–65)
SEGMENTED NEUTROPHILS ABSOLUTE COUNT: 8.54 K/UL (ref 1.5–8.1)
WBC # BLD: 12.5 K/UL (ref 3.5–11.3)
WBC # BLD: ABNORMAL 10*3/UL

## 2018-05-31 PROCEDURE — 85461 HEMOGLOBIN FETAL: CPT

## 2018-05-31 PROCEDURE — 6370000000 HC RX 637 (ALT 250 FOR IP): Performed by: ADVANCED PRACTICE MIDWIFE

## 2018-05-31 PROCEDURE — 36415 COLL VENOUS BLD VENIPUNCTURE: CPT

## 2018-05-31 PROCEDURE — 1220000000 HC SEMI PRIVATE OB R&B

## 2018-05-31 PROCEDURE — 85025 COMPLETE CBC W/AUTO DIFF WBC: CPT

## 2018-05-31 RX ADMIN — IBUPROFEN 800 MG: 800 TABLET ORAL at 19:30

## 2018-05-31 ASSESSMENT — PAIN SCALES - GENERAL: PAINLEVEL_OUTOF10: 5

## 2018-06-01 VITALS
DIASTOLIC BLOOD PRESSURE: 72 MMHG | RESPIRATION RATE: 16 BRPM | TEMPERATURE: 97.7 F | OXYGEN SATURATION: 99 % | HEART RATE: 71 BPM | SYSTOLIC BLOOD PRESSURE: 118 MMHG

## 2018-06-01 PROBLEM — Z34.92 ENCOUNTER FOR SUPERVISION OF NORMAL PREGNANCY IN SECOND TRIMESTER: Chronic | Status: RESOLVED | Noted: 2018-03-04 | Resolved: 2018-06-01

## 2018-06-01 PROBLEM — Z34.93 ENCOUNTER FOR SUPERVISION OF NORMAL PREGNANCY IN THIRD TRIMESTER: Status: RESOLVED | Noted: 2018-05-30 | Resolved: 2018-06-01

## 2018-06-01 PROBLEM — N93.9 VAGINAL BLEEDING: Status: RESOLVED | Noted: 2018-05-22 | Resolved: 2018-06-01

## 2018-06-01 PROBLEM — Z37.9 NORMAL LABOR: Status: RESOLVED | Noted: 2018-05-30 | Resolved: 2018-06-01

## 2018-06-01 PROBLEM — Z3A.27 27 WEEKS GESTATION OF PREGNANCY: Status: RESOLVED | Noted: 2018-03-16 | Resolved: 2018-06-01

## 2018-06-01 LAB — SURGICAL PATHOLOGY REPORT: NORMAL

## 2018-06-01 PROCEDURE — 96372 THER/PROPH/DIAG INJ SC/IM: CPT

## 2018-06-01 PROCEDURE — 99024 POSTOP FOLLOW-UP VISIT: CPT | Performed by: OBSTETRICS & GYNECOLOGY

## 2018-06-01 PROCEDURE — 6360000002 HC RX W HCPCS: Performed by: ADVANCED PRACTICE MIDWIFE

## 2018-06-01 RX ADMIN — HUMAN RHO(D) IMMUNE GLOBULIN 300 MCG: 300 INJECTION, SOLUTION INTRAMUSCULAR at 10:14

## 2018-06-02 LAB
BLD PROD TYP BPU: NORMAL
DISPENSE STATUS BLOOD BANK: NORMAL
TRANSFUSION STATUS: NORMAL
UNIT DIVISION: 0
UNIT NUMBER: NORMAL

## 2019-02-21 ENCOUNTER — TELEPHONE (OUTPATIENT)
Dept: UROLOGY | Age: 24
End: 2019-02-21

## 2019-09-13 ENCOUNTER — HOSPITAL ENCOUNTER (EMERGENCY)
Age: 24
Discharge: HOME | End: 2019-09-13
Attending: EMERGENCY MEDICINE
Payer: COMMERCIAL

## 2019-09-13 VITALS
SYSTOLIC BLOOD PRESSURE: 123 MMHG | DIASTOLIC BLOOD PRESSURE: 98 MMHG | BODY MASS INDEX: 24.96 KG/M2 | HEART RATE: 75 BPM | OXYGEN SATURATION: 100 % | TEMPERATURE: 98.1 F | RESPIRATION RATE: 16 BRPM | WEIGHT: 150 LBS

## 2019-09-13 DIAGNOSIS — K02.9 PAIN DUE TO DENTAL CARIES: Primary | ICD-10-CM

## 2019-09-13 PROCEDURE — 6370000000 HC RX 637 (ALT 250 FOR IP): Performed by: EMERGENCY MEDICINE

## 2019-09-13 PROCEDURE — 99282 EMERGENCY DEPT VISIT SF MDM: CPT

## 2019-09-13 RX ORDER — OXYCODONE HYDROCHLORIDE AND ACETAMINOPHEN 5; 325 MG/1; MG/1
2 TABLET ORAL ONCE
Status: COMPLETED | OUTPATIENT
Start: 2019-09-13 | End: 2019-09-13

## 2019-09-13 RX ORDER — OXYCODONE HYDROCHLORIDE AND ACETAMINOPHEN 5; 325 MG/1; MG/1
1 TABLET ORAL EVERY 6 HOURS PRN
Qty: 12 TABLET | Refills: 0 | Status: SHIPPED | OUTPATIENT
Start: 2019-09-13 | End: 2019-09-16

## 2019-09-13 RX ADMIN — OXYCODONE HYDROCHLORIDE AND ACETAMINOPHEN 2 TABLET: 5; 325 TABLET ORAL at 07:58

## 2019-09-13 ASSESSMENT — PAIN DESCRIPTION - DESCRIPTORS: DESCRIPTORS: ACHING

## 2019-09-13 ASSESSMENT — PAIN SCALES - GENERAL
PAINLEVEL_OUTOF10: 8
PAINLEVEL_OUTOF10: 8

## 2019-09-13 ASSESSMENT — PAIN DESCRIPTION - LOCATION: LOCATION: TEETH

## 2019-09-13 ASSESSMENT — PAIN DESCRIPTION - PAIN TYPE: TYPE: ACUTE PAIN

## 2019-09-13 NOTE — LETTER
[facility] ED  [address] [place] [address] [place] [address]  Phone: [phone]  Fax: [phone]             September 14, 2019    Patient: Karlos Fuchs   YOB: 1995   Date of Visit: 9/13/2019       To Whom It May Concern:    Orlin Chu was seen and treated in our emergency department on 9/13/2019. She is to be excused on 9/13/2019 and can return on 9/14/2019.       Sincerely,             Signature:__________________________________

## 2019-09-16 ENCOUNTER — HOSPITAL ENCOUNTER (OUTPATIENT)
Age: 24
Setting detail: SPECIMEN
Discharge: HOME | End: 2019-09-16
Payer: COMMERCIAL

## 2019-09-16 LAB
ALBUMIN SERPL-MCNC: 4.2 G/DL (ref 3.5–5.2)
ALBUMIN/GLOBULIN RATIO: 1.3 (ref 1–2.5)
ALP BLD-CCNC: 65 U/L (ref 35–104)
ALT SERPL-CCNC: 14 U/L (ref 5–33)
ANION GAP SERPL CALCULATED.3IONS-SCNC: 16 MMOL/L (ref 9–17)
AST SERPL-CCNC: 19 U/L
BILIRUB SERPL-MCNC: 0.16 MG/DL (ref 0.3–1.2)
BUN BLDV-MCNC: 6 MG/DL (ref 6–20)
BUN/CREAT BLD: ABNORMAL (ref 9–20)
CALCIUM SERPL-MCNC: 9.1 MG/DL (ref 8.6–10.4)
CHLORIDE BLD-SCNC: 104 MMOL/L (ref 98–107)
CHOLESTEROL/HDL RATIO: 2.8
CHOLESTEROL: 150 MG/DL
CO2: 23 MMOL/L (ref 20–31)
CREAT SERPL-MCNC: 0.5 MG/DL (ref 0.5–0.9)
GFR AFRICAN AMERICAN: >60 ML/MIN
GFR NON-AFRICAN AMERICAN: >60 ML/MIN
GFR SERPL CREATININE-BSD FRML MDRD: ABNORMAL ML/MIN/{1.73_M2}
GFR SERPL CREATININE-BSD FRML MDRD: ABNORMAL ML/MIN/{1.73_M2}
GLUCOSE BLD-MCNC: 97 MG/DL (ref 70–99)
HDLC SERPL-MCNC: 53 MG/DL
LDL CHOLESTEROL: 86 MG/DL (ref 0–130)
POTASSIUM SERPL-SCNC: 3.5 MMOL/L (ref 3.7–5.3)
SODIUM BLD-SCNC: 143 MMOL/L (ref 135–144)
TOTAL PROTEIN: 7.4 G/DL (ref 6.4–8.3)
TRIGL SERPL-MCNC: 57 MG/DL
TSH SERPL DL<=0.05 MIU/L-ACNC: 0.79 MIU/L (ref 0.3–5)
VLDLC SERPL CALC-MCNC: NORMAL MG/DL (ref 1–30)

## 2019-09-17 LAB
ABSOLUTE EOS #: 0.19 K/UL (ref 0–0.4)
ABSOLUTE IMMATURE GRANULOCYTE: 0 K/UL (ref 0–0.3)
ABSOLUTE LYMPH #: 2.05 K/UL (ref 1–4.8)
ABSOLUTE MONO #: 0.68 K/UL (ref 0.1–0.8)
BASOPHILS # BLD: 0 % (ref 0–2)
BASOPHILS ABSOLUTE: 0 K/UL (ref 0–0.2)
DIFFERENTIAL TYPE: ABNORMAL
EOSINOPHILS RELATIVE PERCENT: 3 % (ref 1–4)
HCT VFR BLD CALC: 30.3 % (ref 36.3–47.1)
HEMOGLOBIN: 8.2 G/DL (ref 11.9–15.1)
IMMATURE GRANULOCYTES: 0 %
LYMPHOCYTES # BLD: 33 % (ref 24–44)
MCH RBC QN AUTO: 18.7 PG (ref 25.2–33.5)
MCHC RBC AUTO-ENTMCNC: 27.1 G/DL (ref 28.4–34.8)
MCV RBC AUTO: 69.2 FL (ref 82.6–102.9)
MONOCYTES # BLD: 11 % (ref 1–7)
MORPHOLOGY: ABNORMAL
MORPHOLOGY: ABNORMAL
NRBC AUTOMATED: 0 PER 100 WBC
PDW BLD-RTO: 18.3 % (ref 11.8–14.4)
PLATELET # BLD: 302 K/UL (ref 138–453)
PLATELET ESTIMATE: ABNORMAL
PMV BLD AUTO: 10.4 FL (ref 8.1–13.5)
RBC # BLD: 4.38 M/UL (ref 3.95–5.11)
RBC # BLD: ABNORMAL 10*6/UL
SEG NEUTROPHILS: 53 % (ref 36–66)
SEGMENTED NEUTROPHILS ABSOLUTE COUNT: 3.28 K/UL (ref 1.8–7.7)
WBC # BLD: 6.2 K/UL (ref 3.5–11.3)
WBC # BLD: ABNORMAL 10*3/UL

## 2020-02-03 ENCOUNTER — HOSPITAL ENCOUNTER (EMERGENCY)
Age: 25
Discharge: HOME | End: 2020-02-03
Payer: COMMERCIAL

## 2020-02-03 VITALS
HEART RATE: 86 BPM | DIASTOLIC BLOOD PRESSURE: 72 MMHG | OXYGEN SATURATION: 99 % | RESPIRATION RATE: 16 BRPM | TEMPERATURE: 98 F | SYSTOLIC BLOOD PRESSURE: 109 MMHG

## 2020-02-03 PROCEDURE — 99282 EMERGENCY DEPT VISIT SF MDM: CPT

## 2020-02-03 PROCEDURE — 6370000000 HC RX 637 (ALT 250 FOR IP): Performed by: PHYSICIAN ASSISTANT

## 2020-02-03 RX ORDER — PENICILLIN V POTASSIUM 500 MG/1
500 TABLET ORAL 4 TIMES DAILY
Qty: 40 TABLET | Refills: 0 | Status: SHIPPED | OUTPATIENT
Start: 2020-02-03 | End: 2020-02-13

## 2020-02-03 RX ORDER — PENICILLIN V POTASSIUM 250 MG/1
500 TABLET ORAL ONCE
Status: COMPLETED | OUTPATIENT
Start: 2020-02-03 | End: 2020-02-03

## 2020-02-03 RX ORDER — NAPROXEN 500 MG/1
500 TABLET ORAL 2 TIMES DAILY
Qty: 14 TABLET | Refills: 0 | Status: SHIPPED | OUTPATIENT
Start: 2020-02-03

## 2020-02-03 RX ORDER — HYDROCODONE BITARTRATE AND ACETAMINOPHEN 5; 325 MG/1; MG/1
1 TABLET ORAL ONCE
Status: COMPLETED | OUTPATIENT
Start: 2020-02-03 | End: 2020-02-03

## 2020-02-03 RX ORDER — IBUPROFEN 800 MG/1
800 TABLET ORAL ONCE
Status: COMPLETED | OUTPATIENT
Start: 2020-02-03 | End: 2020-02-03

## 2020-02-03 RX ADMIN — HYDROCODONE BITARTRATE AND ACETAMINOPHEN 1 TABLET: 5; 325 TABLET ORAL at 19:36

## 2020-02-03 RX ADMIN — PENICILLIN V POTASIUM 500 MG: 250 TABLET ORAL at 19:37

## 2020-02-03 RX ADMIN — IBUPROFEN 800 MG: 800 TABLET, FILM COATED ORAL at 19:36

## 2020-02-03 ASSESSMENT — PAIN DESCRIPTION - PAIN TYPE: TYPE: ACUTE PAIN

## 2020-02-03 ASSESSMENT — PAIN SCALES - GENERAL: PAINLEVEL_OUTOF10: 8

## 2020-02-03 ASSESSMENT — PAIN DESCRIPTION - LOCATION: LOCATION: TEETH

## 2020-02-03 ASSESSMENT — PAIN DESCRIPTION - ORIENTATION: ORIENTATION: LEFT

## 2020-02-04 NOTE — ED PROVIDER NOTES
Socioeconomic History    Marital status: Single     Spouse name: None    Number of children: None    Years of education: None    Highest education level: None   Occupational History    None   Social Needs    Financial resource strain: None    Food insecurity:     Worry: None     Inability: None    Transportation needs:     Medical: None     Non-medical: None   Tobacco Use    Smoking status: Former Smoker     Packs/day: 0.25     Last attempt to quit: 2017     Years since quittin.1    Smokeless tobacco: Never Used   Substance and Sexual Activity    Alcohol use: No    Drug use: No    Sexual activity: Yes     Partners: Male   Lifestyle    Physical activity:     Days per week: None     Minutes per session: None    Stress: None   Relationships    Social connections:     Talks on phone: None     Gets together: None     Attends [employer] service: None     Active member of club or organization: None     Attends meetings of clubs or organizations: None     Relationship status: None    Intimate partner violence:     Fear of current or ex partner: None     Emotionally abused: None     Physically abused: None     Forced sexual activity: None   Other Topics Concern    None   Social History Narrative    None       REVIEW OF SYSTEMS     Review of Systems  Except as noted above the remainder of the review of systems was reviewed and is negative. SCREENINGS           PHYSICAL EXAM    (up to 7 for level 4, 8 or more for level 5)     ED Triage Vitals [20 1914]   BP Temp Temp Source Pulse Resp SpO2 Height Weight   109/72 98 °F (36.7 °C) Oral 86 16 99 % -- --       Physical Exam  Active and oriented ×3. Nontoxic. No acute distress. Well-hydrated. Head is atraumatic, there is edema along the left lateral distal mandible area indicative of a lower tooth infection.   No submental edema noted  Teeth in poor condition large dental caries noted lateral to the left lower gumline there is an area of Ramona Seth PA-C (electronically signed)           Stephanie Hunt II, PA-C  02/03/20 1935

## (undated) DEVICE — SYRINGE MED 20ML STD CLR PLAS LUERLOCK TIP N CTRL DISP

## (undated) DEVICE — Z INACTIVE USE 2660664 SOLUTION IRRIG 3000ML 0.9% SOD CHL USP UROMATIC PLAS CONT

## (undated) DEVICE — Z DISCONTINUED BY MEDLINE USE 2711682 TRAY SKIN PREP DRY W/ PREM GLV

## (undated) DEVICE — GLOVE ORANGE PI 7 1/2   MSG9075

## (undated) DEVICE — GOWN,AURORA,NON-REINFORCED,2XL: Brand: MEDLINE

## (undated) DEVICE — SOLUTION SURG PREP ANTIMICROBIAL 4 OZ SKIN WND EXIDINE

## (undated) DEVICE — SPONGE GZ W4XL4IN CELOS POSTOP AVANT

## (undated) DEVICE — Device

## (undated) DEVICE — SOLUTION IV IRRIG WATER 1000ML POUR BRL 2F7114

## (undated) DEVICE — GUIDEWIRE VASC STR 3 CM 0.035 INX150 CM STD NIT ZIPWIRE

## (undated) DEVICE — SINGLE ACTION PUMPING SYSTEM